# Patient Record
Sex: FEMALE | Race: WHITE | NOT HISPANIC OR LATINO
[De-identification: names, ages, dates, MRNs, and addresses within clinical notes are randomized per-mention and may not be internally consistent; named-entity substitution may affect disease eponyms.]

---

## 2017-01-05 ENCOUNTER — APPOINTMENT (OUTPATIENT)
Dept: CARDIOLOGY | Facility: CLINIC | Age: 52
End: 2017-01-05

## 2017-01-13 ENCOUNTER — APPOINTMENT (OUTPATIENT)
Dept: CARDIOLOGY | Facility: CLINIC | Age: 52
End: 2017-01-13

## 2017-01-16 ENCOUNTER — APPOINTMENT (OUTPATIENT)
Dept: CARDIOLOGY | Facility: CLINIC | Age: 52
End: 2017-01-16

## 2017-03-07 ENCOUNTER — NON-APPOINTMENT (OUTPATIENT)
Age: 52
End: 2017-03-07

## 2017-03-07 ENCOUNTER — APPOINTMENT (OUTPATIENT)
Dept: CARDIOLOGY | Facility: CLINIC | Age: 52
End: 2017-03-07

## 2017-03-07 VITALS
SYSTOLIC BLOOD PRESSURE: 112 MMHG | BODY MASS INDEX: 33.43 KG/M2 | OXYGEN SATURATION: 100 % | WEIGHT: 213 LBS | HEART RATE: 84 BPM | HEIGHT: 67 IN | DIASTOLIC BLOOD PRESSURE: 69 MMHG

## 2018-03-06 ENCOUNTER — EMERGENCY (EMERGENCY)
Facility: HOSPITAL | Age: 53
LOS: 1 days | Discharge: HOME | End: 2018-03-06
Attending: EMERGENCY MEDICINE | Admitting: EMERGENCY MEDICINE
Payer: COMMERCIAL

## 2018-03-06 VITALS
SYSTOLIC BLOOD PRESSURE: 118 MMHG | TEMPERATURE: 99 F | RESPIRATION RATE: 16 BRPM | OXYGEN SATURATION: 96 % | HEART RATE: 75 BPM | DIASTOLIC BLOOD PRESSURE: 77 MMHG

## 2018-03-06 VITALS
RESPIRATION RATE: 15 BRPM | TEMPERATURE: 98 F | WEIGHT: 203.93 LBS | OXYGEN SATURATION: 100 % | SYSTOLIC BLOOD PRESSURE: 140 MMHG | DIASTOLIC BLOOD PRESSURE: 82 MMHG | HEIGHT: 67 IN | HEART RATE: 76 BPM

## 2018-03-06 LAB
ALBUMIN SERPL ELPH-MCNC: 4 G/DL — SIGNIFICANT CHANGE UP (ref 3.3–5)
ALP SERPL-CCNC: 97 U/L — SIGNIFICANT CHANGE UP (ref 40–120)
ALT FLD-CCNC: 30 U/L — SIGNIFICANT CHANGE UP (ref 12–78)
ANION GAP SERPL CALC-SCNC: 9 MMOL/L — SIGNIFICANT CHANGE UP (ref 5–17)
APTT BLD: 32.3 SEC — SIGNIFICANT CHANGE UP (ref 27.5–37.4)
AST SERPL-CCNC: 23 U/L — SIGNIFICANT CHANGE UP (ref 15–37)
BASOPHILS # BLD AUTO: 0.1 K/UL — SIGNIFICANT CHANGE UP (ref 0–0.2)
BASOPHILS NFR BLD AUTO: 1 % — SIGNIFICANT CHANGE UP (ref 0–2)
BILIRUB SERPL-MCNC: 0.4 MG/DL — SIGNIFICANT CHANGE UP (ref 0.2–1.2)
BLD GP AB SCN SERPL QL: SIGNIFICANT CHANGE UP
BUN SERPL-MCNC: 11 MG/DL — SIGNIFICANT CHANGE UP (ref 7–23)
CALCIUM SERPL-MCNC: 8.8 MG/DL — SIGNIFICANT CHANGE UP (ref 8.5–10.1)
CHLORIDE SERPL-SCNC: 105 MMOL/L — SIGNIFICANT CHANGE UP (ref 96–108)
CK MB BLD-MCNC: 1.1 % — SIGNIFICANT CHANGE UP (ref 0–3.5)
CK MB CFR SERPL CALC: 1.8 NG/ML — SIGNIFICANT CHANGE UP (ref 0–3.6)
CK SERPL-CCNC: 169 U/L — SIGNIFICANT CHANGE UP (ref 26–192)
CO2 SERPL-SCNC: 26 MMOL/L — SIGNIFICANT CHANGE UP (ref 22–31)
CREAT SERPL-MCNC: 0.79 MG/DL — SIGNIFICANT CHANGE UP (ref 0.5–1.3)
EOSINOPHIL # BLD AUTO: 0.2 K/UL — SIGNIFICANT CHANGE UP (ref 0–0.5)
EOSINOPHIL NFR BLD AUTO: 2.2 % — SIGNIFICANT CHANGE UP (ref 0–6)
GLUCOSE SERPL-MCNC: 93 MG/DL — SIGNIFICANT CHANGE UP (ref 70–99)
HCG SERPL-ACNC: 2 MIU/ML — SIGNIFICANT CHANGE UP
HCT VFR BLD CALC: 38.7 % — SIGNIFICANT CHANGE UP (ref 34.5–45)
HGB BLD-MCNC: 12.9 G/DL — SIGNIFICANT CHANGE UP (ref 11.5–15.5)
INR BLD: 0.96 RATIO — SIGNIFICANT CHANGE UP (ref 0.88–1.16)
LYMPHOCYTES # BLD AUTO: 1.6 K/UL — SIGNIFICANT CHANGE UP (ref 1–3.3)
LYMPHOCYTES # BLD AUTO: 16.1 % — SIGNIFICANT CHANGE UP (ref 13–44)
MCHC RBC-ENTMCNC: 28.4 PG — SIGNIFICANT CHANGE UP (ref 27–34)
MCHC RBC-ENTMCNC: 33.2 GM/DL — SIGNIFICANT CHANGE UP (ref 32–36)
MCV RBC AUTO: 85.5 FL — SIGNIFICANT CHANGE UP (ref 80–100)
MONOCYTES # BLD AUTO: 0.7 K/UL — SIGNIFICANT CHANGE UP (ref 0–0.9)
MONOCYTES NFR BLD AUTO: 7 % — SIGNIFICANT CHANGE UP (ref 1–9)
NEUTROPHILS # BLD AUTO: 7.1 K/UL — SIGNIFICANT CHANGE UP (ref 1.8–7.4)
NEUTROPHILS NFR BLD AUTO: 73.6 % — SIGNIFICANT CHANGE UP (ref 43–77)
PLATELET # BLD AUTO: 331 K/UL — SIGNIFICANT CHANGE UP (ref 150–400)
POTASSIUM SERPL-MCNC: 3.5 MMOL/L — SIGNIFICANT CHANGE UP (ref 3.5–5.3)
POTASSIUM SERPL-SCNC: 3.5 MMOL/L — SIGNIFICANT CHANGE UP (ref 3.5–5.3)
PROT SERPL-MCNC: 8.2 G/DL — SIGNIFICANT CHANGE UP (ref 6–8.3)
PROTHROM AB SERPL-ACNC: 10.5 SEC — SIGNIFICANT CHANGE UP (ref 9.8–12.7)
RBC # BLD: 4.53 M/UL — SIGNIFICANT CHANGE UP (ref 3.8–5.2)
RBC # FLD: 11.7 % — SIGNIFICANT CHANGE UP (ref 10.3–14.5)
SODIUM SERPL-SCNC: 140 MMOL/L — SIGNIFICANT CHANGE UP (ref 135–145)
TROPONIN I SERPL-MCNC: <.015 NG/ML — SIGNIFICANT CHANGE UP (ref 0.01–0.04)
WBC # BLD: 9.7 K/UL — SIGNIFICANT CHANGE UP (ref 3.8–10.5)
WBC # FLD AUTO: 9.7 K/UL — SIGNIFICANT CHANGE UP (ref 3.8–10.5)

## 2018-03-06 PROCEDURE — 72125 CT NECK SPINE W/O DYE: CPT

## 2018-03-06 PROCEDURE — 85730 THROMBOPLASTIN TIME PARTIAL: CPT

## 2018-03-06 PROCEDURE — 82553 CREATINE MB FRACTION: CPT

## 2018-03-06 PROCEDURE — 99285 EMERGENCY DEPT VISIT HI MDM: CPT

## 2018-03-06 PROCEDURE — 82550 ASSAY OF CK (CPK): CPT

## 2018-03-06 PROCEDURE — 86850 RBC ANTIBODY SCREEN: CPT

## 2018-03-06 PROCEDURE — 71275 CT ANGIOGRAPHY CHEST: CPT | Mod: 26

## 2018-03-06 PROCEDURE — 99284 EMERGENCY DEPT VISIT MOD MDM: CPT | Mod: 25

## 2018-03-06 PROCEDURE — 86901 BLOOD TYPING SEROLOGIC RH(D): CPT

## 2018-03-06 PROCEDURE — 86900 BLOOD TYPING SEROLOGIC ABO: CPT

## 2018-03-06 PROCEDURE — 85610 PROTHROMBIN TIME: CPT

## 2018-03-06 PROCEDURE — 70450 CT HEAD/BRAIN W/O DYE: CPT | Mod: 26

## 2018-03-06 PROCEDURE — 71275 CT ANGIOGRAPHY CHEST: CPT

## 2018-03-06 PROCEDURE — 70450 CT HEAD/BRAIN W/O DYE: CPT

## 2018-03-06 PROCEDURE — 71045 X-RAY EXAM CHEST 1 VIEW: CPT | Mod: 26

## 2018-03-06 PROCEDURE — 93005 ELECTROCARDIOGRAM TRACING: CPT

## 2018-03-06 PROCEDURE — 72125 CT NECK SPINE W/O DYE: CPT | Mod: 26

## 2018-03-06 PROCEDURE — 71045 X-RAY EXAM CHEST 1 VIEW: CPT

## 2018-03-06 PROCEDURE — 84702 CHORIONIC GONADOTROPIN TEST: CPT

## 2018-03-06 PROCEDURE — 80053 COMPREHEN METABOLIC PANEL: CPT

## 2018-03-06 PROCEDURE — 93010 ELECTROCARDIOGRAM REPORT: CPT

## 2018-03-06 PROCEDURE — 85027 COMPLETE CBC AUTOMATED: CPT

## 2018-03-06 PROCEDURE — 36415 COLL VENOUS BLD VENIPUNCTURE: CPT

## 2018-03-06 PROCEDURE — 84484 ASSAY OF TROPONIN QUANT: CPT

## 2018-03-06 PROCEDURE — 73590 X-RAY EXAM OF LOWER LEG: CPT

## 2018-03-06 PROCEDURE — 73590 X-RAY EXAM OF LOWER LEG: CPT | Mod: 26,RT

## 2018-03-06 RX ORDER — OXYCODONE AND ACETAMINOPHEN 5; 325 MG/1; MG/1
1 TABLET ORAL
Qty: 12 | Refills: 0
Start: 2018-03-06 | End: 2018-03-07

## 2018-03-06 RX ORDER — ONDANSETRON 8 MG/1
4 TABLET, FILM COATED ORAL ONCE
Qty: 0 | Refills: 0 | Status: COMPLETED | OUTPATIENT
Start: 2018-03-06 | End: 2018-03-06

## 2018-03-06 RX ORDER — SODIUM CHLORIDE 9 MG/ML
1000 INJECTION INTRAMUSCULAR; INTRAVENOUS; SUBCUTANEOUS
Qty: 0 | Refills: 0 | Status: COMPLETED | OUTPATIENT
Start: 2018-03-06 | End: 2018-03-06

## 2018-03-06 RX ORDER — OXYCODONE AND ACETAMINOPHEN 5; 325 MG/1; MG/1
1 TABLET ORAL ONCE
Qty: 0 | Refills: 0 | Status: DISCONTINUED | OUTPATIENT
Start: 2018-03-06 | End: 2018-03-06

## 2018-03-06 RX ORDER — ONDANSETRON 8 MG/1
1 TABLET, FILM COATED ORAL
Qty: 9 | Refills: 0
Start: 2018-03-06 | End: 2018-03-08

## 2018-03-06 RX ADMIN — SODIUM CHLORIDE 2000 MILLILITER(S): 9 INJECTION INTRAMUSCULAR; INTRAVENOUS; SUBCUTANEOUS at 17:00

## 2018-03-06 RX ADMIN — OXYCODONE AND ACETAMINOPHEN 1 TABLET(S): 5; 325 TABLET ORAL at 21:28

## 2018-03-06 RX ADMIN — OXYCODONE AND ACETAMINOPHEN 1 TABLET(S): 5; 325 TABLET ORAL at 20:58

## 2018-03-06 RX ADMIN — SODIUM CHLORIDE 2000 MILLILITER(S): 9 INJECTION INTRAMUSCULAR; INTRAVENOUS; SUBCUTANEOUS at 19:35
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
## 2018-03-06 NOTE — ED PROVIDER NOTE - CONSTITUTIONAL, MLM
normal... Well appearing, well nourished, awake, alert, oriented to person, place, time/situation and in mild distress

## 2018-03-06 NOTE — ED PROVIDER NOTE - MUSCULOSKELETAL, MLM
Spine appears normal, range of motion is not limited, right shin ttp with ecchymosis, no deformity, no signs compartment syndrome, 2+ pulses
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
## 2018-03-06 NOTE — ED ADULT NURSE REASSESSMENT NOTE - NS ED NURSE REASSESS COMMENT FT1
I spoke with Dr. Kearns that patient had a allergy to codeine states had mouth swelling and he ordered to give Percocet 1 tab once. As per Dr. Kearns I can give the medication.

## 2018-03-06 NOTE — ED PROVIDER NOTE - CARE PLAN
Principal Discharge DX:	Leg injury, right, initial encounter  Secondary Diagnosis:	Chest wall injury, initial encounter

## 2018-03-06 NOTE — ED PROVIDER NOTE - CARDIAC, MLM
Normal rate, regular rhythm.  Heart sounds S1, S2.  No murmurs, rubs or gallops. + sternal ttp, no crepitus , ribs nt
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
## 2018-03-27 ENCOUNTER — APPOINTMENT (OUTPATIENT)
Dept: CARDIOLOGY | Facility: CLINIC | Age: 53
End: 2018-03-27
Payer: COMMERCIAL

## 2018-03-27 ENCOUNTER — NON-APPOINTMENT (OUTPATIENT)
Age: 53
End: 2018-03-27

## 2018-03-27 VITALS — OXYGEN SATURATION: 100 % | HEART RATE: 66 BPM | SYSTOLIC BLOOD PRESSURE: 115 MMHG | DIASTOLIC BLOOD PRESSURE: 72 MMHG

## 2018-03-27 DIAGNOSIS — R94.39 ABNORMAL RESULT OF OTHER CARDIOVASCULAR FUNCTION STUDY: ICD-10-CM

## 2018-03-27 PROCEDURE — 99214 OFFICE O/P EST MOD 30 MIN: CPT | Mod: 25

## 2018-03-27 PROCEDURE — 93000 ELECTROCARDIOGRAM COMPLETE: CPT

## 2018-04-04 ENCOUNTER — APPOINTMENT (OUTPATIENT)
Dept: CARDIOLOGY | Facility: CLINIC | Age: 53
End: 2018-04-04
Payer: COMMERCIAL

## 2018-04-04 PROCEDURE — 93306 TTE W/DOPPLER COMPLETE: CPT

## 2019-01-17 PROBLEM — E78.5 HYPERLIPIDEMIA, UNSPECIFIED: Chronic | Status: ACTIVE | Noted: 2018-03-06

## 2019-01-17 PROBLEM — F41.9 ANXIETY DISORDER, UNSPECIFIED: Chronic | Status: ACTIVE | Noted: 2018-03-06

## 2019-01-30 ENCOUNTER — APPOINTMENT (OUTPATIENT)
Dept: GASTROENTEROLOGY | Facility: CLINIC | Age: 54
End: 2019-01-30
Payer: COMMERCIAL

## 2019-01-30 VITALS
DIASTOLIC BLOOD PRESSURE: 80 MMHG | RESPIRATION RATE: 14 BRPM | OXYGEN SATURATION: 96 % | WEIGHT: 214 LBS | SYSTOLIC BLOOD PRESSURE: 137 MMHG | BODY MASS INDEX: 33.59 KG/M2 | HEIGHT: 67 IN | HEART RATE: 74 BPM

## 2019-01-30 DIAGNOSIS — R13.10 DYSPHAGIA, UNSPECIFIED: ICD-10-CM

## 2019-01-30 DIAGNOSIS — Z86.39 PERSONAL HISTORY OF OTHER ENDOCRINE, NUTRITIONAL AND METABOLIC DISEASE: ICD-10-CM

## 2019-01-30 PROCEDURE — 99203 OFFICE O/P NEW LOW 30 MIN: CPT

## 2019-01-30 NOTE — PHYSICAL EXAM
[General Appearance - Alert] : alert [General Appearance - In No Acute Distress] : in no acute distress [FreeTextEntry1] : A set pleasant female, no acute distress alert and oriented x3 [Sclera] : the sclera and conjunctiva were normal [Neck Appearance] : the appearance of the neck was normal [Neck Cervical Mass (___cm)] : no neck mass was observed [Jugular Venous Distention Increased] : there was no jugular-venous distention [Respiration, Rhythm And Depth] : normal respiratory rhythm and effort [Exaggerated Use Of Accessory Muscles For Inspiration] : no accessory muscle use [Auscultation Breath Sounds / Voice Sounds] : lungs were clear to auscultation bilaterally [Apical Impulse] : the apical impulse was normal [Heart Rate And Rhythm] : heart rate was normal and rhythm regular [Full Pulse] : the pedal pulses are present [Edema] : there was no peripheral edema [Bowel Sounds] : normal bowel sounds [Abdomen Soft] : soft [Abdomen Tenderness] : non-tender [] : no hepato-splenomegaly [Abdomen Mass (___ Cm)] : no abdominal mass palpated [No CVA Tenderness] : no ~M costovertebral angle tenderness [No Spinal Tenderness] : no spinal tenderness [Abnormal Walk] : normal gait [Nail Clubbing] : no clubbing  or cyanosis of the fingernails [Musculoskeletal - Swelling] : no joint swelling seen [Motor Tone] : muscle strength and tone were normal [No Focal Deficits] : no focal deficits [Oriented To Time, Place, And Person] : oriented to person, place, and time [Impaired Insight] : insight and judgment were intact [Affect] : the affect was normal

## 2019-01-30 NOTE — CONSULT LETTER
[Dear  ___] : Dear  [unfilled], [Consult Letter:] : I had the pleasure of evaluating your patient, [unfilled]. [Please see my note below.] : Please see my note below. [Consult Closing:] : Thank you very much for allowing me to participate in the care of this patient.  If you have any questions, please do not hesitate to contact me. [Sincerely,] : Sincerely, [FreeTextEntry2] : Corine Mitchell MD\par [address]\[place] [address]\par  [FreeTextEntry3] : Cisco Freitas MD\par

## 2019-01-30 NOTE — ASSESSMENT
[FreeTextEntry1] : Impression\par \par Gastroesophageal reflux disease\par \par Brother had esophageal cancer\par \par Intermittent dysphasia or\par \par Only using omeprazole on an infrequent basis at this time\par \par Previous upper endoscopy and colonoscopy 3 years ago without significant pathology\par \par Postprandial epigastric and sometimes atypical chest discomfort with radiation under the right ribs into the back\par \par Previous ultrasound negative for gallstones, last one done over 3 years ago\par \par Suggest\par \par Low fat diet\par \par Omeprazole a regular basis\par \par Blood work here today\par \par Ultrasound of the abdomen to look for gallstones or other pathology\par \par Upper endoscopy\par \par Go to emergency room if needed\par \par The patient may call me or return anytime sooner as needed\par \par Risks/benefits:\par The procedure, the risks and benefits and alternatives have been reviewed in great detail with the patient.  Risks including, but not limited to sedation such as cardiac and pulmonary compromise, the procedure itself such as bleeding requiring hospitalization, transfusion, surgery, temporary or permanent colostomy.  Perforation or puncture of the requiring hospitalization, surgery, temporary colostomy.\par \par The patient expresses understanding of the procedure and consents to undergoing the procedure.\par \par

## 2019-01-30 NOTE — REASON FOR VISIT
[Initial Evaluation] : an initial evaluation [FreeTextEntry1] : Brother had esophageal cancer, Gastroesophageal reflux disease, some questionable dysphagia, atypical chest pain, negative cardiac workup a year or 2 ago, epigastric discomfort predominantly postprandial,

## 2019-01-30 NOTE — HISTORY OF PRESENT ILLNESS
[de-identified] : Dr. Mitchell Takes care of this very pleasant 53-year-old female. She's been having intermittent epigastric and atypical chest discomfort that radiates to the back. Pain under the right ribs and radiating to the back. She has heartburn at times, mild sense of dysphagia to solids at times. She's had ultrasounds in the past that were negative for gallstones. The last one probably about 3 years ago. She had an echocardiogram last year that was normal. She thallium stress test that was done 2 years ago that was normal. She is on omeprazole but only using it intermittently. She has a brother that had esophageal cancer. She's had upper endoscopies and colonoscopies. The last of both were probably about 3 years ago. Is no weight loss. No odynophagia. No fever or chills. No anorexia, nausea or vomiting.

## 2019-01-31 ENCOUNTER — OTHER (OUTPATIENT)
Age: 54
End: 2019-01-31

## 2019-01-31 ENCOUNTER — MOBILE ON CALL (OUTPATIENT)
Age: 54
End: 2019-01-31

## 2019-01-31 LAB
ALBUMIN SERPL ELPH-MCNC: 4.4 G/DL
ALP BLD-CCNC: 88 U/L
ALT SERPL-CCNC: 25 U/L
AMYLASE/CREAT SERPL: 34 U/L
ANION GAP SERPL CALC-SCNC: 12 MMOL/L
AST SERPL-CCNC: 31 U/L
BASOPHILS # BLD AUTO: 0.02 K/UL
BASOPHILS NFR BLD AUTO: 0.3 %
BILIRUB SERPL-MCNC: 0.4 MG/DL
BUN SERPL-MCNC: 12 MG/DL
CALCIUM SERPL-MCNC: 9.3 MG/DL
CANCER AG19-9 SERPL-ACNC: 21.2 U/ML
CEA SERPL-MCNC: 1.5 NG/ML
CHLORIDE SERPL-SCNC: 104 MMOL/L
CO2 SERPL-SCNC: 24 MMOL/L
CREAT SERPL-MCNC: 0.72 MG/DL
EOSINOPHIL # BLD AUTO: 0.15 K/UL
EOSINOPHIL NFR BLD AUTO: 2.2 %
FERRITIN SERPL-MCNC: 58 NG/ML
GLUCOSE SERPL-MCNC: 87 MG/DL
HCT VFR BLD CALC: 38.9 %
HGB BLD-MCNC: 12.4 G/DL
IMM GRANULOCYTES NFR BLD AUTO: 0.1 %
IRON SATN MFR SERPL: 19 %
IRON SERPL-MCNC: 60 UG/DL
LPL SERPL-CCNC: 30 U/L
LYMPHOCYTES # BLD AUTO: 1.62 K/UL
LYMPHOCYTES NFR BLD AUTO: 23.4 %
MAN DIFF?: NORMAL
MCHC RBC-ENTMCNC: 27.4 PG
MCHC RBC-ENTMCNC: 31.9 GM/DL
MCV RBC AUTO: 86.1 FL
MONOCYTES # BLD AUTO: 0.61 K/UL
MONOCYTES NFR BLD AUTO: 8.8 %
NEUTROPHILS # BLD AUTO: 4.52 K/UL
NEUTROPHILS NFR BLD AUTO: 65.2 %
PLATELET # BLD AUTO: 315 K/UL
POTASSIUM SERPL-SCNC: 4.4 MMOL/L
PROT SERPL-MCNC: 7.1 G/DL
RBC # BLD: 4.52 M/UL
RBC # FLD: 13.9 %
SODIUM SERPL-SCNC: 140 MMOL/L
TIBC SERPL-MCNC: 313 UG/DL
UIBC SERPL-MCNC: 253 UG/DL
WBC # FLD AUTO: 6.93 K/UL

## 2019-02-13 ENCOUNTER — OTHER (OUTPATIENT)
Age: 54
End: 2019-02-13

## 2019-02-13 ENCOUNTER — TRANSCRIPTION ENCOUNTER (OUTPATIENT)
Age: 54
End: 2019-02-13

## 2019-03-04 ENCOUNTER — TRANSCRIPTION ENCOUNTER (OUTPATIENT)
Age: 54
End: 2019-03-04

## 2019-03-19 ENCOUNTER — APPOINTMENT (OUTPATIENT)
Dept: GASTROENTEROLOGY | Facility: HOSPITAL | Age: 54
End: 2019-03-19

## 2019-04-22 ENCOUNTER — OTHER (OUTPATIENT)
Age: 54
End: 2019-04-22

## 2019-04-22 ENCOUNTER — TRANSCRIPTION ENCOUNTER (OUTPATIENT)
Age: 54
End: 2019-04-22

## 2019-04-23 ENCOUNTER — RESULT REVIEW (OUTPATIENT)
Age: 54
End: 2019-04-23

## 2019-04-23 ENCOUNTER — APPOINTMENT (OUTPATIENT)
Dept: GASTROENTEROLOGY | Facility: HOSPITAL | Age: 54
End: 2019-04-23
Payer: COMMERCIAL

## 2019-04-23 ENCOUNTER — OUTPATIENT (OUTPATIENT)
Dept: OUTPATIENT SERVICES | Facility: HOSPITAL | Age: 54
LOS: 1 days | End: 2019-04-23
Payer: COMMERCIAL

## 2019-04-23 DIAGNOSIS — Z80.0 FAMILY HISTORY OF MALIGNANT NEOPLASM OF DIGESTIVE ORGANS: ICD-10-CM

## 2019-04-23 DIAGNOSIS — R13.10 DYSPHAGIA, UNSPECIFIED: ICD-10-CM

## 2019-04-23 DIAGNOSIS — K58.9 IRRITABLE BOWEL SYNDROME WITHOUT DIARRHEA: ICD-10-CM

## 2019-04-23 DIAGNOSIS — R10.13 EPIGASTRIC PAIN: ICD-10-CM

## 2019-04-23 LAB — HCG UR QL: NEGATIVE — SIGNIFICANT CHANGE UP

## 2019-04-23 PROCEDURE — 88312 SPECIAL STAINS GROUP 1: CPT

## 2019-04-23 PROCEDURE — 88312 SPECIAL STAINS GROUP 1: CPT | Mod: 26

## 2019-04-23 PROCEDURE — 88305 TISSUE EXAM BY PATHOLOGIST: CPT

## 2019-04-23 PROCEDURE — 43239 EGD BIOPSY SINGLE/MULTIPLE: CPT

## 2019-04-23 PROCEDURE — 88313 SPECIAL STAINS GROUP 2: CPT

## 2019-04-23 PROCEDURE — 88313 SPECIAL STAINS GROUP 2: CPT | Mod: 26

## 2019-04-23 PROCEDURE — 88305 TISSUE EXAM BY PATHOLOGIST: CPT | Mod: 26

## 2019-04-23 PROCEDURE — 81025 URINE PREGNANCY TEST: CPT

## 2019-04-24 ENCOUNTER — OTHER (OUTPATIENT)
Age: 54
End: 2019-04-24

## 2019-04-24 LAB — SURGICAL PATHOLOGY STUDY: SIGNIFICANT CHANGE UP

## 2019-07-03 ENCOUNTER — OTHER (OUTPATIENT)
Age: 54
End: 2019-07-03

## 2019-07-29 ENCOUNTER — APPOINTMENT (OUTPATIENT)
Dept: UROGYNECOLOGY | Facility: CLINIC | Age: 54
End: 2019-07-29
Payer: COMMERCIAL

## 2019-07-29 DIAGNOSIS — N36.41 HYPERMOBILITY OF URETHRA: ICD-10-CM

## 2019-07-29 LAB
BILIRUB UR QL STRIP: NORMAL
CLARITY UR: CLEAR
COLLECTION METHOD: NORMAL
GLUCOSE UR-MCNC: NORMAL
HCG UR QL: 0.2 EU/DL
HGB UR QL STRIP.AUTO: NORMAL
KETONES UR-MCNC: NORMAL
LEUKOCYTE ESTERASE UR QL STRIP: NORMAL
NITRITE UR QL STRIP: NORMAL
PH UR STRIP: 7.5
PROT UR STRIP-MCNC: NORMAL
SP GR UR STRIP: 1.01

## 2019-07-29 PROCEDURE — 81003 URINALYSIS AUTO W/O SCOPE: CPT | Mod: QW

## 2019-07-29 PROCEDURE — 51701 INSERT BLADDER CATHETER: CPT

## 2019-07-29 PROCEDURE — 99215 OFFICE O/P EST HI 40 MIN: CPT | Mod: 25

## 2019-07-29 NOTE — PHYSICAL EXAM
[Well developed] : well developed [No Acute Distress] : in no acute distress [Oriented x3] : oriented to person, place, and time [Well Nourished] : ~L well nourished [Bulbocavernous] : bulbocavernous was present [Anal Reflex] : the anal reflex was present [Normal Appearance] : general appearance was normal [Atrophy] : atrophy [Aa ____] : Aa [unfilled] [C ____] : C [unfilled] [Ba ____] : Ba [unfilled] [PB ____] : PB [unfilled] [Ap ____] : Ap [unfilled] [TVL ____] : TVL  [unfilled] [Bp ____] : Bp [unfilled] [D ____] : D [unfilled] [Normal] : normal [Post Void Residual ____ml] : post void residual via catheterization was [unfilled] ml [Cystocele] : a cystocele [Uterine Prolapse] : uterine prolapse

## 2019-07-29 NOTE — HISTORY OF PRESENT ILLNESS
[Cystocele (Obstetric)] : frequent [Uterine Prolapse] : daily [Vaginal Wall Prolapse] : none [Rectal Prolapse] : none [Stress Incontinence] : none [Unable To Restrain Bowel Movement] : none [Feelings Of Urinary Urgency] : rare [x2] : two times a night [Hematuria] : none [Urinary Frequency] : none [Pain During Urination (Dysuria)] : none [Urinary Tract Infection] : none [Constipation Obstructed Defecation] : frequent [Stool Visible Blood] : none [Pelvic Pain] : none [Incomplete Emptying Of Stool] : none [Vaginal Pain] : none [] : none [Rectal Pain] : none [Sexual Dysfunction, NOS] : none [de-identified] : 3 [de-identified] : 3 [FreeTextEntry4] : LMP was 15-16 months ago [de-identified] : 3 [de-identified] : Reports doing kegel exercises daily for the last several years [de-identified] : 3 [de-identified] : 3 [de-identified] : 3 [de-identified] : Reports intake of about 48oz of flavored water and 8oz tea per day.  [FreeTextEntry1] : \par 55yo P4 presenting for follow up of uterovaginal prolapse and stress incontinence. She reports trying years of Kegel exercises without improvement. She desires definitive management. Reports episode of postmenopausal bleeding in March and had a pelvic ultrasound and endometrial biopsy with GYN (Dr. Adriano Hagan).

## 2019-07-29 NOTE — DISCUSSION/SUMMARY
[FreeTextEntry1] :  I reviewed the above findings with the patient with visual illustrations. Treatment options for the prolapse were discussed and included doing nothing, Kegel exercises and behavioral modification, a pessary, or surgical correction.Surgically we discussed the abdominal vs the vaginal routes. Abdominally we discussed a hysterectomy and a sacral colpopexy   laparoscopically and robotically.  Vaginally we discussed a vaginal hysterectomy, uterosacral suspension, and anterior/posterior repair. Surgically we discussed hysteropexy as well as the use of biologics. She is interested in surgical correction. Patient given IUGA POP and stress incontinence handouts.  We discussed prior to surgical intervention, she will return for urodynamic testing to further evaluate her urinary complaints. She states she had vaginal bleeding in March and had a pelvic ultrasound and endometrial biopsy performed. We will try to obtain these records and patient will bring the to her next visit. All questions addressed and answered.

## 2019-07-29 NOTE — CHIEF COMPLAINT
[Falling Pelvic Organs] : falling pelvic organs [Cannot Empty Bladder] : cannot empty bladder [Pelvic Strengthening] : pelvic strengthening

## 2019-08-26 ENCOUNTER — APPOINTMENT (OUTPATIENT)
Dept: UROGYNECOLOGY | Facility: CLINIC | Age: 54
End: 2019-08-26
Payer: COMMERCIAL

## 2019-08-26 ENCOUNTER — OUTPATIENT (OUTPATIENT)
Dept: OUTPATIENT SERVICES | Facility: HOSPITAL | Age: 54
LOS: 1 days | End: 2019-08-26
Payer: MEDICAID

## 2019-08-26 PROCEDURE — 51797 INTRAABDOMINAL PRESSURE TEST: CPT | Mod: 26

## 2019-08-26 PROCEDURE — 51784 ANAL/URINARY MUSCLE STUDY: CPT

## 2019-08-26 PROCEDURE — 51797 INTRAABDOMINAL PRESSURE TEST: CPT

## 2019-08-26 PROCEDURE — 51729 CYSTOMETROGRAM W/VP&UP: CPT

## 2019-08-26 PROCEDURE — 51729 CYSTOMETROGRAM W/VP&UP: CPT | Mod: 26

## 2019-08-26 PROCEDURE — 51784 ANAL/URINARY MUSCLE STUDY: CPT | Mod: 26

## 2019-08-28 ENCOUNTER — APPOINTMENT (OUTPATIENT)
Dept: UROGYNECOLOGY | Facility: CLINIC | Age: 54
End: 2019-08-28
Payer: COMMERCIAL

## 2019-08-28 DIAGNOSIS — Z01.818 ENCOUNTER FOR OTHER PREPROCEDURAL EXAMINATION: ICD-10-CM

## 2019-08-28 PROCEDURE — 99214 OFFICE O/P EST MOD 30 MIN: CPT

## 2019-08-28 NOTE — HISTORY OF PRESENT ILLNESS
[FreeTextEntry1] : Patient returns today for followup on her uterovaginal prolapse and urinary incontinence. Her chart was reviewed. Review of symptoms was unchanged from visit dated July 29, 2019. On that visit she had a stage III uterovaginal prolapse with cystocele and rectocele. She had an endometrial biopsy performed in March of this year which revealed an atrophic endometrium she had a pelvic ultrasound in May of this year which showed a uterus 3.6 x 5.2 x 5.7.She underwent urodynamic testing earlier this month which revealed stress urinary incontinence. I reviewed these findings with her.  She has been doing Kegels. PSH + BTL. I reviewed the above findings with the patient with visual illustrations. Treatment options for the prolapse were discussed and included doing nothing, Kegel exercises and behavioral modification, a pessary, or surgical correction.Surgically we discussed the abdominal vs the vaginal routes. Abdominally we discussed a hysterectomy and a sacral colpopexy   laparoscopically and robotically.  Vaginally we discussed a vaginal hysterectomy, uterosacral suspension, and anterior/posterior repair. Surgically we discussed hysteropexy as well as the use of biologics. We discussed the stress incontinence as well as surgical and nonsurgical treatment options. Risks and benefits of the various procedures were discussed as well as hospital stay and postoperative restrictions. We discussed the possibility of going home with the catheter as well as failure. She like to think about her options however is leaning towards the laparoscopic/robotic route for the prolapse and sling for stress incontinence. I UGA patient information on sacral colpopexy and mid urethral sling was given to her. She will call me with her final decision and that she would like to schedule surgery. All questions were answered.\par 
No

## 2019-10-30 ENCOUNTER — OUTPATIENT (OUTPATIENT)
Dept: OUTPATIENT SERVICES | Facility: HOSPITAL | Age: 54
LOS: 1 days | End: 2019-10-30
Payer: COMMERCIAL

## 2019-10-30 VITALS
DIASTOLIC BLOOD PRESSURE: 77 MMHG | RESPIRATION RATE: 15 BRPM | TEMPERATURE: 98 F | WEIGHT: 203.05 LBS | HEART RATE: 72 BPM | SYSTOLIC BLOOD PRESSURE: 120 MMHG | OXYGEN SATURATION: 98 % | HEIGHT: 60.5 IN

## 2019-10-30 DIAGNOSIS — N39.3 STRESS INCONTINENCE (FEMALE) (MALE): ICD-10-CM

## 2019-10-30 DIAGNOSIS — N81.4 UTEROVAGINAL PROLAPSE, UNSPECIFIED: ICD-10-CM

## 2019-10-30 DIAGNOSIS — Z98.51 TUBAL LIGATION STATUS: Chronic | ICD-10-CM

## 2019-10-30 DIAGNOSIS — J32.9 CHRONIC SINUSITIS, UNSPECIFIED: ICD-10-CM

## 2019-10-30 DIAGNOSIS — N81.11 CYSTOCELE, MIDLINE: ICD-10-CM

## 2019-10-30 DIAGNOSIS — Z29.9 ENCOUNTER FOR PROPHYLACTIC MEASURES, UNSPECIFIED: ICD-10-CM
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
## 2019-10-30 DIAGNOSIS — Z01.818 ENCOUNTER FOR OTHER PREPROCEDURAL EXAMINATION: ICD-10-CM

## 2019-10-30 DIAGNOSIS — N81.2 INCOMPLETE UTEROVAGINAL PROLAPSE: ICD-10-CM

## 2019-10-30 LAB
ANION GAP SERPL CALC-SCNC: 12 MMOL/L — SIGNIFICANT CHANGE UP (ref 5–17)
BLD GP AB SCN SERPL QL: NEGATIVE — SIGNIFICANT CHANGE UP
BUN SERPL-MCNC: 14 MG/DL — SIGNIFICANT CHANGE UP (ref 7–23)
CALCIUM SERPL-MCNC: 10.2 MG/DL — SIGNIFICANT CHANGE UP (ref 8.4–10.5)
CHLORIDE SERPL-SCNC: 101 MMOL/L — SIGNIFICANT CHANGE UP (ref 96–108)
CO2 SERPL-SCNC: 26 MMOL/L — SIGNIFICANT CHANGE UP (ref 22–31)
CREAT SERPL-MCNC: 0.82 MG/DL — SIGNIFICANT CHANGE UP (ref 0.5–1.3)
GLUCOSE SERPL-MCNC: 133 MG/DL — HIGH (ref 70–99)
HCT VFR BLD CALC: 41.6 % — SIGNIFICANT CHANGE UP (ref 34.5–45)
HGB BLD-MCNC: 13.5 G/DL — SIGNIFICANT CHANGE UP (ref 11.5–15.5)
MCHC RBC-ENTMCNC: 28.5 PG — SIGNIFICANT CHANGE UP (ref 27–34)
MCHC RBC-ENTMCNC: 32.5 GM/DL — SIGNIFICANT CHANGE UP (ref 32–36)
MCV RBC AUTO: 87.9 FL — SIGNIFICANT CHANGE UP (ref 80–100)
PLATELET # BLD AUTO: 392 K/UL — SIGNIFICANT CHANGE UP (ref 150–400)
POTASSIUM SERPL-MCNC: 4.4 MMOL/L — SIGNIFICANT CHANGE UP (ref 3.5–5.3)
POTASSIUM SERPL-SCNC: 4.4 MMOL/L — SIGNIFICANT CHANGE UP (ref 3.5–5.3)
RBC # BLD: 4.73 M/UL — SIGNIFICANT CHANGE UP (ref 3.8–5.2)
RBC # FLD: 12.5 % — SIGNIFICANT CHANGE UP (ref 10.3–14.5)
RH IG SCN BLD-IMP: POSITIVE — SIGNIFICANT CHANGE UP
SODIUM SERPL-SCNC: 139 MMOL/L — SIGNIFICANT CHANGE UP (ref 135–145)
WBC # BLD: 7.97 K/UL — SIGNIFICANT CHANGE UP (ref 3.8–10.5)
WBC # FLD AUTO: 7.97 K/UL — SIGNIFICANT CHANGE UP (ref 3.8–10.5)

## 2019-10-30 PROCEDURE — 86901 BLOOD TYPING SEROLOGIC RH(D): CPT

## 2019-10-30 PROCEDURE — 83036 HEMOGLOBIN GLYCOSYLATED A1C: CPT

## 2019-10-30 PROCEDURE — 86900 BLOOD TYPING SEROLOGIC ABO: CPT

## 2019-10-30 PROCEDURE — G0463: CPT

## 2019-10-30 PROCEDURE — 80048 BASIC METABOLIC PNL TOTAL CA: CPT

## 2019-10-30 PROCEDURE — 85027 COMPLETE CBC AUTOMATED: CPT

## 2019-10-30 PROCEDURE — 86850 RBC ANTIBODY SCREEN: CPT

## 2019-10-30 RX ORDER — CHLORHEXIDINE GLUCONATE 213 G/1000ML
1 SOLUTION TOPICAL ONCE
Refills: 0 | Status: DISCONTINUED | OUTPATIENT
Start: 2019-11-12 | End: 2019-11-12

## 2019-10-30 RX ORDER — LIDOCAINE HCL 20 MG/ML
0.2 VIAL (ML) INJECTION ONCE
Refills: 0 | Status: DISCONTINUED | OUTPATIENT
Start: 2019-11-12 | End: 2019-11-12

## 2019-10-30 RX ORDER — CEFOTETAN DISODIUM 1 G
2 VIAL (EA) INJECTION ONCE
Refills: 0 | Status: COMPLETED | OUTPATIENT
Start: 2019-11-12 | End: 2019-11-12

## 2019-10-30 NOTE — H&P PST ADULT - ASSESSMENT
prolapse of uterus   CAPRINI VTE 2.0 SCORE [CLOT updated 2019]    AGE RELATED RISK FACTORS                                                       MOBILITY RELATED FACTORS  [x ] Age 41-60 years                                            (1 Point)                    [ ] Bed rest                                                        (1 Point)  [ ] Age: 61-74 years                                           (2 Points)                  [ ] Plaster cast                                                   (2 Points)  [ ] Age= 75 years                                              (3 Points)                    [ ] Bed bound for more than 72 hours                 (2 Points)    DISEASE RELATED RISK FACTORS                                               GENDER SPECIFIC FACTORS  [ ] Edema in the lower extremities                       (1 Point)              [ ] Pregnancy                                                     (1 Point)  [ ] Varicose veins                                               (1 Point)                     [ ] Post-partum < 6 weeks                                   (1 Point)             [x ] BMI > 25 Kg/m2                                            (1 Point)                     [ ] Hormonal therapy  or oral contraception          (1 Point)                 [ ] Sepsis (in the previous month)                        (1 Point)               [ ] History of pregnancy complications                 (1 point)  [ ] Pneumonia or serious lung disease                                               [ ] Unexplained or recurrent                     (1 Point)           (in the previous month)                               (1 Point)  [ ] Abnormal pulmonary function test                     (1 Point)                 SURGERY RELATED RISK FACTORS  [ ] Acute myocardial infarction                              (1 Point)               [ ]  Section                                             (1 Point)  [ ] Congestive heart failure (in the previous month)  (1 Point)      [ ] Minor surgery                                                  (1 Point)   [ ] Inflammatory bowel disease                             (1 Point)               [ ] Arthroscopic surgery                                        (2 Points)  [ ] Central venous access                                      (2 Points)                [x ] General surgery lasting more than 45 minutes (2 points)  [ ] Malignancy- Present or previous                   (2 Points)                [ ] Elective arthroplasty                                         (5 points)    [ ] Stroke (in the previous month)                          (5 Points)                                                                                                                                                           HEMATOLOGY RELATED FACTORS                                                 TRAUMA RELATED RISK FACTORS  [ ] Prior episodes of VTE                                     (3 Points)                [ ] Fracture of the hip, pelvis, or leg                       (5 Points)  [ ] Positive family history for VTE                         (3 Points)             [ ] Acute spinal cord injury (in the previous month)  (5 Points)  [ ] Prothrombin 32353 A                                     (3 Points)               [ ] Paralysis  (less than 1 month)                             (5 Points)  [ ] Factor V Leiden                                             (3 Points)                  [ ] Multiple Trauma within 1 month                        (5 Points)  [ ] Lupus anticoagulants                                     (3 Points)                                                           [ ] Anticardiolipin antibodies                               (3 Points)                                                       [ ] High homocysteine in the blood                      (3 Points)                                             [ ] Other congenital or acquired thrombophilia      (3 Points)                                                [ ] Heparin induced thrombocytopenia                  (3 Points)                                     Total Score [ 4  ]

## 2019-10-30 NOTE — H&P PST ADULT - NSICDXPASTMEDICALHX_GEN_ALL_CORE_FT
PAST MEDICAL HISTORY:  Anxiety     Chronic sinusitis for 20 years  DNS  S/p urgent care visit today before pre op/ upcoming GYn surgery    HLD (hyperlipidemia)     Uterus prolapse
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
## 2019-10-30 NOTE — H&P PST ADULT - HISTORY OF PRESENT ILLNESS
54 year old multiparous  female PMH of HLD, chronic sinusitis, prolapse of uterus with stress incontinence, scheduled for midurethral sling, cystoscopy & laparoscopic robotic assisted suprapubic hysterectomy on 11/12/19.

## 2019-10-30 NOTE — H&P PST ADULT - NSANTHOSAYNRD_GEN_A_CORE
No. PARVIN screening performed.  STOP BANG Legend: 0-2 = LOW Risk; 3-4 = INTERMEDIATE Risk; 5-8 = HIGH Risk

## 2019-10-30 NOTE — H&P PST ADULT - NSICDXPROBLEM_GEN_ALL_CORE_FT
PROBLEM DIAGNOSES  Problem: Uterine prolapse  Assessment and Plan: midurethral sling  cystoscopy  laparoscopic robotic assisted suprapubic hystrectomy  sacral colpopexy     Problem: Chronic sinusitis  Assessment and Plan: patient on steroids/ augmentn from today   Instructed to continue meds , antibiotics till 11/5/19  pre op medical eval     Problem: Prophylactic measure  Assessment and Plan: The Caprini score indicates this patient is at risk for a VTE event (score 3-5).  Most surgical patients in this group would benefit from pharmacologic prophylaxis.  The surgical team will determine the balance between VTE risk and bleeding risk

## 2019-10-31 LAB — HBA1C BLD-MCNC: 5.6 % — SIGNIFICANT CHANGE UP (ref 4–5.6)

## 2019-11-06 ENCOUNTER — APPOINTMENT (OUTPATIENT)
Dept: UROGYNECOLOGY | Facility: CLINIC | Age: 54
End: 2019-11-06
Payer: COMMERCIAL

## 2019-11-06 PROCEDURE — 99214 OFFICE O/P EST MOD 30 MIN: CPT

## 2019-11-06 NOTE — PHYSICAL EXAM
[No Acute Distress] : in no acute distress [Well developed] : well developed [Well Nourished] : ~L well nourished [Oriented x3] : oriented to person, place, and time [Bulbocavernous] : bulbocavernous was present [Anal Reflex] : the anal reflex was present [Normal Appearance] : general appearance was normal [Atrophy] : atrophy [Cystocele] : a cystocele [Uterine Prolapse] : uterine prolapse [Aa ____] : Aa [unfilled] [Ba ____] : Ba [unfilled] [C ____] : C [unfilled] [PB ____] : PB [unfilled] [TVL ____] : TVL  [unfilled] [Ap ____] : Ap [unfilled] [Bp ____] : Bp [unfilled] [D ____] : D [unfilled] [Normal] : normal [Post Void Residual ____ml] : post void residual via catheterization was [unfilled] ml

## 2019-11-07 NOTE — DISCUSSION/SUMMARY
[FreeTextEntry1] : Maura  presents for followup, she has uterovaginal prolapse and stress urinary incontinence and desires surgical management. Visual illustrations were used and mesh was shown to the patient. I reviewed the above findings with the patient as well surgical approaches including vaginal and abdominal, mesh and native tissue repairs, and nonsurgical treatment options for the prolapse and stress incontinence and she wishes to proceed with surgical correction via robotic assisted laparoscopic hysterectomy and sacral colpopexy for the prolapse and mid urethral sling for stress incontinence. Risks and benefits of a supracervical hysterectomy versus total were discussed and she wishes to proceed with a supracervical hysterectomy. Risks and benefits of the BSO were discussed and she wishes to proceed with a bilateral salpingectomy and possible oophorectomy if ovaries appear abnormal at time of surgery. We discussed the possibility of a laparotomy at the time of surgical correction. We discussed the placement of a suburethral sling at time of surgical correction for prolapse. Risks and benefits of a TOT sling versus a retropubic sling were discussed and included but not limited to bladder and bowel perforation, voiding dysfunction, and groin pain. She wishes to proceed with a retropubic sling. She is aware surgery is not meant to correct OAB symptoms. The FDA notification on mesh was discussed.  Risks and benefits of the procedure were discussed and included but not limited to infection, bleeding, including transfusion, surrounding organ or tissue injury (bladder, rectum, bowel, urethra, ureters, nerves vessels or muscles), failure meaning recurrent prolapse, leaking, voiding dysfunction, needing to go home with a catheter, pain with sex, blood clots, and anesthesia. In addition we discussed risks related to mesh including but not limited to infection, erosion and chronic inflammation, acute and chronic pain, pain with intercourse (both of which may be refractory to treatment) fistula, cervical elongation, disturbance in bowel or bladder function, allergic reaction any of which may require additional surgery for mesh revision.  She is aware that the mesh used in her surgery is a permanent mesh. We discussed the possibility of going home with a catheter. Hospital stay, postoperative restrictions, and anesthesia were discussed. All risks were explained in layman’s terms All questions were answered and she wishes to proceed with surgical correction.  Consent was signed in the office for robotic assisted supracervical hysterectomy, bilateral salpingectomy, sacral colpopexy, midurethral sling, cystoscopy possible laparotomy, possible oophorectomy.  IUGA handout on sacral colpopexy and midurethral sling was given to her. I was able to answer all her questions.\par \par [] med clearance\par [] surgical booking\par \par \par

## 2019-11-07 NOTE — HISTORY OF PRESENT ILLNESS
[FreeTextEntry1] : 53yo with Stage 3 prolapse and AMARILYS presents for follow up. She continues to be interested in surgery. She has no other complaints today.\par \par PMH: HLD, IBS\par PSH: none\par \par US: 3.6x5.19x5.67cm, fibroid uterus, ET 4mm\par UDS: +CST at all vol, no ISD, no DI\par Pap: 9/2018, neg

## 2019-11-08 ENCOUNTER — RESULT REVIEW (OUTPATIENT)
Age: 54
End: 2019-11-08

## 2019-11-11 ENCOUNTER — TRANSCRIPTION ENCOUNTER (OUTPATIENT)
Age: 54
End: 2019-11-11

## 2019-11-12 ENCOUNTER — INPATIENT (INPATIENT)
Facility: HOSPITAL | Age: 54
LOS: 0 days | Discharge: HOME | DRG: 743 | End: 2019-11-13
Attending: UROLOGY | Admitting: UROLOGY
Payer: COMMERCIAL

## 2019-11-12 ENCOUNTER — APPOINTMENT (OUTPATIENT)
Dept: UROGYNECOLOGY | Facility: HOSPITAL | Age: 54
End: 2019-11-12

## 2019-11-12 VITALS — HEIGHT: 60.5 IN | WEIGHT: 203.05 LBS

## 2019-11-12 DIAGNOSIS — N81.2 INCOMPLETE UTEROVAGINAL PROLAPSE: ICD-10-CM

## 2019-11-12 DIAGNOSIS — N81.11 CYSTOCELE, MIDLINE: ICD-10-CM
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
## 2019-11-12 DIAGNOSIS — Z98.51 TUBAL LIGATION STATUS: Chronic | ICD-10-CM

## 2019-11-12 LAB
GLUCOSE BLDC GLUCOMTR-MCNC: 98 MG/DL — SIGNIFICANT CHANGE UP (ref 70–99)
RH IG SCN BLD-IMP: POSITIVE — SIGNIFICANT CHANGE UP

## 2019-11-12 PROCEDURE — 88302 TISSUE EXAM BY PATHOLOGIST: CPT | Mod: 26

## 2019-11-12 PROCEDURE — 88305 TISSUE EXAM BY PATHOLOGIST: CPT | Mod: 26

## 2019-11-12 PROCEDURE — 57288 REPAIR BLADDER DEFECT: CPT

## 2019-11-12 PROCEDURE — 58542 LSH W/T/O UT 250 G OR LESS: CPT

## 2019-11-12 PROCEDURE — 57425 LAPAROSCOPY SURG COLPOPEXY: CPT

## 2019-11-12 RX ORDER — HYDROMORPHONE HYDROCHLORIDE 2 MG/ML
0.5 INJECTION INTRAMUSCULAR; INTRAVENOUS; SUBCUTANEOUS
Refills: 0 | Status: DISCONTINUED | OUTPATIENT
Start: 2019-11-12 | End: 2019-11-13

## 2019-11-12 RX ORDER — ONDANSETRON 8 MG/1
4 TABLET, FILM COATED ORAL EVERY 8 HOURS
Refills: 0 | Status: DISCONTINUED | OUTPATIENT
Start: 2019-11-12 | End: 2019-11-13

## 2019-11-12 RX ORDER — TRAMADOL HYDROCHLORIDE 50 MG/1
25 TABLET ORAL EVERY 6 HOURS
Refills: 0 | Status: DISCONTINUED | OUTPATIENT
Start: 2019-11-12 | End: 2019-11-13

## 2019-11-12 RX ORDER — METOCLOPRAMIDE HCL 10 MG
10 TABLET ORAL ONCE
Refills: 0 | Status: DISCONTINUED | OUTPATIENT
Start: 2019-11-12 | End: 2019-11-13

## 2019-11-12 RX ORDER — ACETAMINOPHEN 500 MG
1000 TABLET ORAL ONCE
Refills: 0 | Status: COMPLETED | OUTPATIENT
Start: 2019-11-12 | End: 2019-11-12

## 2019-11-12 RX ORDER — ONDANSETRON 8 MG/1
4 TABLET, FILM COATED ORAL ONCE
Refills: 0 | Status: DISCONTINUED | OUTPATIENT
Start: 2019-11-12 | End: 2019-11-13

## 2019-11-12 RX ORDER — KETOROLAC TROMETHAMINE 30 MG/ML
30 SYRINGE (ML) INJECTION EVERY 6 HOURS
Refills: 0 | Status: DISCONTINUED | OUTPATIENT
Start: 2019-11-12 | End: 2019-11-13

## 2019-11-12 RX ORDER — FAMOTIDINE 10 MG/ML
20 INJECTION INTRAVENOUS ONCE
Refills: 0 | Status: COMPLETED | OUTPATIENT
Start: 2019-11-12 | End: 2019-11-12

## 2019-11-12 RX ORDER — ACETAMINOPHEN 500 MG
975 TABLET ORAL EVERY 6 HOURS
Refills: 0 | Status: DISCONTINUED | OUTPATIENT
Start: 2019-11-12 | End: 2019-11-13

## 2019-11-12 RX ORDER — CELECOXIB 200 MG/1
200 CAPSULE ORAL ONCE
Refills: 0 | Status: COMPLETED | OUTPATIENT
Start: 2019-11-12 | End: 2019-11-12

## 2019-11-12 RX ORDER — SODIUM CHLORIDE 9 MG/ML
1000 INJECTION, SOLUTION INTRAVENOUS
Refills: 0 | Status: DISCONTINUED | OUTPATIENT
Start: 2019-11-12 | End: 2019-11-13

## 2019-11-12 RX ORDER — ACETAMINOPHEN 500 MG
975 TABLET ORAL ONCE
Refills: 0 | Status: DISCONTINUED | OUTPATIENT
Start: 2019-11-12 | End: 2019-11-12

## 2019-11-12 RX ADMIN — Medication 30 MILLIGRAM(S): at 19:42

## 2019-11-12 RX ADMIN — CELECOXIB 200 MILLIGRAM(S): 200 CAPSULE ORAL at 12:30

## 2019-11-12 RX ADMIN — SODIUM CHLORIDE 75 MILLILITER(S): 9 INJECTION, SOLUTION INTRAVENOUS at 21:50

## 2019-11-12 RX ADMIN — Medication 30 MILLIGRAM(S): at 20:30

## 2019-11-12 RX ADMIN — Medication 400 MILLIGRAM(S): at 23:17

## 2019-11-12 RX ADMIN — FAMOTIDINE 20 MILLIGRAM(S): 10 INJECTION INTRAVENOUS at 12:28

## 2019-11-12 RX ADMIN — HYDROMORPHONE HYDROCHLORIDE 0.5 MILLIGRAM(S): 2 INJECTION INTRAMUSCULAR; INTRAVENOUS; SUBCUTANEOUS at 20:30

## 2019-11-12 RX ADMIN — HYDROMORPHONE HYDROCHLORIDE 0.5 MILLIGRAM(S): 2 INJECTION INTRAMUSCULAR; INTRAVENOUS; SUBCUTANEOUS at 19:42

## 2019-11-12 NOTE — PACU DISCHARGE NOTE - MENTAL STATUS: PATIENT PARTICIPATION

## 2019-11-12 NOTE — BRIEF OPERATIVE NOTE - NSICDXBRIEFPROCEDURE_GEN_ALL_CORE_FT
PROCEDURES:  Hysterectomy, supracervical, robot-assisted, laparoscopic, for uterus 250 grams or less 12-Nov-2019 18:17:27  Viktoriya Casillas  Robot-assisted sacrocolpopexy 12-Nov-2019 18:16:04  Viktoriya Casillas  Bilateral salpingectomy 12-Nov-2019 18:17:35  Viktoriya Casillas  Creation, midurethral sling, retropubic approach 12-Nov-2019 18:15:51  Viktoriya Casillas  Cystoscopy 12-Nov-2019 18:15:43  Viktoriya Casillas

## 2019-11-12 NOTE — CHART NOTE - NSCHARTNOTEFT_GEN_A_CORE
R2 OBGYN POST-OP CHECK    S: Patient seen and evaluated at bedside.  Pt awake and alert resting comfortaby in bed   Patient reports pain controlled with analgesia. Pt denies N/V, SOB, CP, palpitations, fever/chills. Tolerating solids.  Not OOB yet.    O:   T(C): 36.8 (11-13-19 @ 00:19), Max: 36.8 (11-13-19 @ 00:19)  HR: 79 (11-12-19 @ 22:30) (79 - 81)  BP: 111/71 (11-12-19 @ 22:30) (111/71 - 122/68)  RR: 19 (11-13-19 @ 00:19) (16 - 19)  SpO2: 96% (11-13-19 @ 00:19) (94% - 97%)  Wt(kg): --  I&O's Summary    12 Nov 2019 07:01  -  13 Nov 2019 01:56  --------------------------------------------------------  IN: 600 mL / OUT: 205 mL / NET: 395 mL        Gen: Resting comfortably in bed, NAD  CV: S1S2, RRR  Lungs: CTA B/L  Abd: soft, appropriately tender, occasional BS x 4 quadrants.    Inc: Port incision/sling incision Clean/dry/intact w/ bandage in place  Ext: SCD's in place and functional, non-tender b/l, no edema    A/P: 54y Female s/p RA KYMBERLY, BS, MUS, Cysto, CSS doing well post-operatively    Neuro: PO Analgesia PRN  vs. PCA for pain control  CV: Hemodynamically stable.  Monitor VS. CBC in AM.   Pulm: Saturating well on room air.  Encourage OOB and incentive spirometer use.   GI: Advance to regular diet. Anti-emetics PRN.  : Bai to gravity. TOV in AM  FEN: Electrolytes: LR@75cc/hr. BMP in AM.   Heme: DVT ppx w/ SCD's while in bed. Early ambulation, initially with assistance then as tolerated.  Continue HSQ   ID: Afebrile  Endo: No active issues   Dispo: Continue inpatient care    Noam Rollins PGY-2

## 2019-11-12 NOTE — BRIEF OPERATIVE NOTE - NSICDXBRIEFPREOP_GEN_ALL_CORE_FT
PRE-OP DIAGNOSIS:  Urinary, incontinence, stress female 12-Nov-2019 18:18:02  Viktoriya Casillas  Incomplete uterovaginal prolapse 12-Nov-2019 18:17:56  Viktoriya Casillas

## 2019-11-12 NOTE — BRIEF OPERATIVE NOTE - NSICDXBRIEFPOSTOP_GEN_ALL_CORE_FT
POST-OP DIAGNOSIS:  Urinary, incontinence, stress female 12-Nov-2019 18:18:18  Viktoriya Casillas  Incomplete uterovaginal prolapse 12-Nov-2019 18:18:12  Viktoriya Casillas

## 2019-11-12 NOTE — BRIEF OPERATIVE NOTE - OPERATION/FINDINGS
8wk sized mobile, anteverted uterus. Portions of fallopian tubes surgically absent bilaterally. Normal appearing ovaries.  On cystoscopy, normal bladder mucosa. No suture, injury, mesh, or foreign object. Bilateral ureteral orifices visualized and effluxing clear yellow urine. On rectal exam, no injury or suture.

## 2019-11-12 NOTE — BRIEF OPERATIVE NOTE - COMMENTS
Palliative Medicine Follow-up Note    Date of Service: 11/02/19    CURRENT ISSUES: Significant clinical deterioration in past 24 hours; chest wall and abdominal pain; code status clarification    Subjective:   Much better pain control today. Patient able to rest. Dr Russell saw this morning, did talk with daughter a bit about hospice option. Spoke with her myself as well. Patient says she is much more comfortable, more awake. Does have some pain still on her side at times, isn't gone completely. We spoke about using the PRN dose if needed or if she has a flare. Spoke with daughter about hospice, and she notes she has been talking some with patient. Said that she had wanted to go home, but they don't feel it is possible, and I agree if they don't feel it is a good option we should look elsewhere. Talked about [facility], which daughter was interested in looking into. She said that she may go and have a look at it. Was willing to talk with SW about a referral as well.     PHYSICAL EXAM:    Vital Last Value 24 Hour Range   Temperature 97.7 °F (36.5 °C) (11/02/19 0522) Temp  Min: 97.7 °F (36.5 °C)  Max: 98.3 °F (36.8 °C)   Pulse 108 (11/02/19 0522) Pulse  Min: 100  Max: 110   Respiratory 18 (11/02/19 0949) Resp  Min: 18  Max: 22   Non-Invasive  Blood Pressure 128/69 (11/02/19 0522) BP  Min: 115/65  Max: 128/69   Pulse Oximetry 96 % (11/02/19 0949) SpO2  Min: 92 %  Max: 96 %   Arterial   Blood Pressure   No data recorded   LBM: 1 (11/02/19 0845)     Intake/Output Summary (Last 24 hours) at 11/2/2019 1205  Last data filed at 11/2/2019 1013  Gross per 24 hour   Intake 560.27 ml   Output --   Net 560.27 ml        On exam, Latha is more alert, eyes open, answering questions about her pain appropriately, face relaxed, breathing is regular and unlabored, no myoclonus.     Labs  Albumin (g/dL)   Date Value   10/31/2019 2.6 (L)   10/30/2019 2.7 (L)   10/29/2019 2.6 (L)   10/28/2019 3.1 (L)     Creatinine (mg/dL)   Date Value    11/01/2019 0.62   10/31/2019 0.95   10/30/2019 0.72   10/29/2019 0.78     AST/SGOT (Units/L)   Date Value   10/31/2019 186 (H)   10/30/2019 52 (H)   10/29/2019 50 (H)   10/28/2019 60 (H)     ALT/SGPT (Units/L)   Date Value   10/31/2019 55   10/30/2019 17   10/29/2019 18   10/28/2019 22     ALK PHOSPHATASE (Units/L)   Date Value   10/31/2019 415 (H)   10/30/2019 273 (H)   10/29/2019 259 (H)   10/28/2019 333 (H)     TOTAL BILIRUBIN (mg/dL)   Date Value   10/31/2019 3.9 (H)   10/30/2019 0.9   10/29/2019 0.8   10/28/2019 0.9       Recent Imaging/Procedures   KUB Supine overnight 10/31  IMPRESSION:     1.  No dilated loops of small or large bowel are visualized.  2.  Small bilateral pleural effusion and associated airspace opacification,  better visualized on CT chest 10/20/2019.    Palliative Assessment and Plan:    Goals of Care/Counseling/Coordination of Care:   Hospice planning - daughter looking at options, may go take a look at [facility], and she is also communicating with patient. They confirmed we are on the same page in that hospice is a good option to look into from here. I did speak with SW about making a referral and further support for answering any additional hospice questions.     Symptom Management:  Pain: Much better today. On dilaudid gtt at 0.3 mg/hr.. No PRN doses needed. Could trial backing off a smidge if pt up for it at any point, but will not change today given that she still has some complaints and has just finally been feeling comfortable.     Dyspnea: on O2, no evident dyspnea      Billing Statement:  Total time for today's encounter was >15 minutes, with more than 50% of that time spent counseling and coordinating care regarding the above.     Patient's care was discussed with bedside nurse and Dr. Russell, as well as FANI Durán MD  [place] [facility]  [phone]             Operative report [id number]

## 2019-11-13 ENCOUNTER — TRANSCRIPTION ENCOUNTER (OUTPATIENT)
Age: 54
End: 2019-11-13

## 2019-11-13 VITALS
RESPIRATION RATE: 16 BRPM | SYSTOLIC BLOOD PRESSURE: 103 MMHG | DIASTOLIC BLOOD PRESSURE: 61 MMHG | HEART RATE: 82 BPM | TEMPERATURE: 98 F | OXYGEN SATURATION: 96 %

## 2019-11-13 DIAGNOSIS — Z98.890 OTHER SPECIFIED POSTPROCEDURAL STATES: ICD-10-CM

## 2019-11-13 LAB
ANION GAP SERPL CALC-SCNC: 13 MMOL/L — SIGNIFICANT CHANGE UP (ref 5–17)
BASOPHILS # BLD AUTO: 0.01 K/UL — SIGNIFICANT CHANGE UP (ref 0–0.2)
BASOPHILS NFR BLD AUTO: 0.1 % — SIGNIFICANT CHANGE UP (ref 0–2)
BUN SERPL-MCNC: 9 MG/DL — SIGNIFICANT CHANGE UP (ref 7–23)
CALCIUM SERPL-MCNC: 9.1 MG/DL — SIGNIFICANT CHANGE UP (ref 8.4–10.5)
CHLORIDE SERPL-SCNC: 100 MMOL/L — SIGNIFICANT CHANGE UP (ref 96–108)
CO2 SERPL-SCNC: 26 MMOL/L — SIGNIFICANT CHANGE UP (ref 22–31)
CREAT SERPL-MCNC: 0.84 MG/DL — SIGNIFICANT CHANGE UP (ref 0.5–1.3)
EOSINOPHIL # BLD AUTO: 0 K/UL — SIGNIFICANT CHANGE UP (ref 0–0.5)
EOSINOPHIL NFR BLD AUTO: 0 % — SIGNIFICANT CHANGE UP (ref 0–6)
GLUCOSE SERPL-MCNC: 120 MG/DL — HIGH (ref 70–99)
HCT VFR BLD CALC: 33.5 % — LOW (ref 34.5–45)
HCT VFR BLD CALC: 33.6 % — LOW (ref 34.5–45)
HGB BLD-MCNC: 11.2 G/DL — LOW (ref 11.5–15.5)
HGB BLD-MCNC: 11.3 G/DL — LOW (ref 11.5–15.5)
IMM GRANULOCYTES NFR BLD AUTO: 0.5 % — SIGNIFICANT CHANGE UP (ref 0–1.5)
LYMPHOCYTES # BLD AUTO: 1.26 K/UL — SIGNIFICANT CHANGE UP (ref 1–3.3)
LYMPHOCYTES # BLD AUTO: 8.7 % — LOW (ref 13–44)
MCHC RBC-ENTMCNC: 28.8 PG — SIGNIFICANT CHANGE UP (ref 27–34)
MCHC RBC-ENTMCNC: 33.7 GM/DL — SIGNIFICANT CHANGE UP (ref 32–36)
MCV RBC AUTO: 85.5 FL — SIGNIFICANT CHANGE UP (ref 80–100)
MONOCYTES # BLD AUTO: 1.05 K/UL — HIGH (ref 0–0.9)
MONOCYTES NFR BLD AUTO: 7.2 % — SIGNIFICANT CHANGE UP (ref 2–14)
NEUTROPHILS # BLD AUTO: 12.15 K/UL — HIGH (ref 1.8–7.4)
NEUTROPHILS NFR BLD AUTO: 83.5 % — HIGH (ref 43–77)
NRBC # BLD: 0 /100 WBCS — SIGNIFICANT CHANGE UP (ref 0–0)
PLATELET # BLD AUTO: 303 K/UL — SIGNIFICANT CHANGE UP (ref 150–400)
POTASSIUM SERPL-MCNC: 4.4 MMOL/L — SIGNIFICANT CHANGE UP (ref 3.5–5.3)
POTASSIUM SERPL-SCNC: 4.4 MMOL/L — SIGNIFICANT CHANGE UP (ref 3.5–5.3)
RBC # BLD: 3.92 M/UL — SIGNIFICANT CHANGE UP (ref 3.8–5.2)
RBC # FLD: 13 % — SIGNIFICANT CHANGE UP (ref 10.3–14.5)
SODIUM SERPL-SCNC: 139 MMOL/L — SIGNIFICANT CHANGE UP (ref 135–145)
WBC # BLD: 14.55 K/UL — HIGH (ref 3.8–10.5)
WBC # FLD AUTO: 14.55 K/UL — HIGH (ref 3.8–10.5)

## 2019-11-13 PROCEDURE — 88302 TISSUE EXAM BY PATHOLOGIST: CPT

## 2019-11-13 PROCEDURE — 85027 COMPLETE CBC AUTOMATED: CPT

## 2019-11-13 PROCEDURE — 85014 HEMATOCRIT: CPT

## 2019-11-13 PROCEDURE — C1889: CPT

## 2019-11-13 PROCEDURE — S2900: CPT

## 2019-11-13 PROCEDURE — 86901 BLOOD TYPING SEROLOGIC RH(D): CPT

## 2019-11-13 PROCEDURE — C1781: CPT

## 2019-11-13 PROCEDURE — 88305 TISSUE EXAM BY PATHOLOGIST: CPT

## 2019-11-13 PROCEDURE — 85018 HEMOGLOBIN: CPT

## 2019-11-13 PROCEDURE — 86900 BLOOD TYPING SEROLOGIC ABO: CPT

## 2019-11-13 PROCEDURE — 82962 GLUCOSE BLOOD TEST: CPT

## 2019-11-13 PROCEDURE — 80048 BASIC METABOLIC PNL TOTAL CA: CPT

## 2019-11-13 RX ORDER — SODIUM CHLORIDE 9 MG/ML
3 INJECTION INTRAMUSCULAR; INTRAVENOUS; SUBCUTANEOUS EVERY 8 HOURS
Refills: 0 | Status: DISCONTINUED | OUTPATIENT
Start: 2019-11-13 | End: 2019-11-13

## 2019-11-13 RX ORDER — IBUPROFEN 200 MG
600 TABLET ORAL EVERY 6 HOURS
Refills: 0 | Status: DISCONTINUED | OUTPATIENT
Start: 2019-11-13 | End: 2019-11-13

## 2019-11-13 RX ORDER — TRAMADOL HYDROCHLORIDE 50 MG/1
1 TABLET ORAL
Qty: 8 | Refills: 0
Start: 2019-11-13

## 2019-11-13 RX ORDER — ACETAMINOPHEN 500 MG
3 TABLET ORAL
Qty: 0 | Refills: 0 | DISCHARGE
Start: 2019-11-13

## 2019-11-13 RX ORDER — PANTOPRAZOLE SODIUM 20 MG/1
40 TABLET, DELAYED RELEASE ORAL
Refills: 0 | Status: DISCONTINUED | OUTPATIENT
Start: 2019-11-13 | End: 2019-11-13

## 2019-11-13 RX ADMIN — Medication 600 MILLIGRAM(S): at 12:30

## 2019-11-13 RX ADMIN — Medication 600 MILLIGRAM(S): at 11:37

## 2019-11-13 RX ADMIN — Medication 1000 MILLIGRAM(S): at 00:29

## 2019-11-13 RX ADMIN — Medication 30 MILLIGRAM(S): at 07:02

## 2019-11-13 RX ADMIN — Medication 30 MILLIGRAM(S): at 00:24

## 2019-11-13 RX ADMIN — Medication 975 MILLIGRAM(S): at 15:16

## 2019-11-13 RX ADMIN — Medication 30 MILLIGRAM(S): at 01:29

## 2019-11-13 RX ADMIN — PANTOPRAZOLE SODIUM 40 MILLIGRAM(S): 20 TABLET, DELAYED RELEASE ORAL at 07:57

## 2019-11-13 RX ADMIN — Medication 975 MILLIGRAM(S): at 06:21

## 2019-11-13 NOTE — DISCHARGE NOTE PROVIDER - NSDCCPTREATMENT_GEN_ALL_CORE_FT
PRINCIPAL PROCEDURE  Procedure: Hysterectomy, supracervical, robot-assisted, laparoscopic, for uterus 250 grams or less  Findings and Treatment:       SECONDARY PROCEDURE  Procedure: Creation, midurethral sling, retropubic approach  Findings and Treatment:

## 2019-11-13 NOTE — DISCHARGE NOTE PROVIDER - NSDCMRMEDTOKEN_GEN_ALL_CORE_FT
acetaminophen 325 mg oral tablet: 3 tab(s) orally every 6 hours  ibuprofen 200 mg oral tablet: 3 tab(s) orally every 6 hours  Lipitor:   omeprazole 10 mg oral delayed release capsule:   traMADol 50 mg oral tablet: 1 tab(s) orally every 6 hours, As Needed -for severe pain MDD:4

## 2019-11-13 NOTE — DISCHARGE NOTE NURSING/CASE MANAGEMENT/SOCIAL WORK - PATIENT PORTAL LINK FT
You can access the FollowMyHealth Patient Portal offered by [facility] by registering at the following website: [email or web address]. By joining [employer]’s FollowMyHealth portal, you will also be able to view your health information using other applications (apps) compatible with our system.

## 2019-11-13 NOTE — DISCHARGE NOTE PROVIDER - CARE PROVIDERS DIRECT ADDRESSES
[email or web address]

## 2019-11-13 NOTE — PROGRESS NOTE ADULT - SUBJECTIVE AND OBJECTIVE BOX
R3 Urogyn Note     Patient seen and examined at bedside, no acute overnight events. No acute complaints, reports pain well controlled. She has ambulated and is tolerating regular diet. Has not yet passed flatus. Patel is still in place. Denies CP, SOB, N/V, fevers, and chills.    Vital Signs Last 24 Hours  T(C): 36.8 (11-13-19 @ 05:12), Max: 36.9 (11-12-19 @ 12:05)  HR: 72 (11-13-19 @ 05:12) (64 - 86)  BP: 105/61 (11-13-19 @ 05:12) (105/61 - 132/81)  RR: 18 (11-13-19 @ 05:12) (12 - 19)  SpO2: 93% (11-13-19 @ 05:12) (93% - 100%)    I&O's Summary    12 Nov 2019 07:01  -  13 Nov 2019 06:45  --------------------------------------------------------  IN: 1258 mL / OUT: 1705 mL / NET: -447 mL  UOP overnight: 1685cc       Physical Exam:  General: NAD  CV: RRR  Lungs: CTA-B  Abdomen: Soft, mildly tender, non-distended, +BS  : patel draining clear yellow urine   Incision: five laparoscopic sites CDI, two suprapubic incisions from MUS CDI   Ext: No pain or swelling    Labs:  AM CBC/BMP pending     MEDICATIONS  (STANDING):  acetaminophen   Tablet .. 975 milliGRAM(s) Oral every 6 hours  ketorolac   Injectable 30 milliGRAM(s) IV Push every 6 hours  lactated ringers. 1000 milliLiter(s) (75 mL/Hr) IV Continuous <Continuous>    MEDICATIONS  (PRN):  HYDROmorphone  Injectable 0.5 milliGRAM(s) IV Push every 10 minutes PRN Moderate Pain (4 - 6)  metoclopramide Injectable 10 milliGRAM(s) IV Push once PRN Nausea and/or Vomiting  ondansetron Injectable 4 milliGRAM(s) IV Push once PRN Nausea and/or Vomiting  ondansetron Injectable 4 milliGRAM(s) IV Push every 8 hours PRN Postoperative Nausea and/or Vomiting  traMADol 25 milliGRAM(s) Oral every 6 hours PRN Severe Pain (7 - 10) R3 Urogyn Note     Patient seen and examined at bedside, no acute overnight events. No acute complaints, reports pain well controlled. She has ambulated and is tolerating regular diet. Has not yet passed flatus. Patel is still in place. Denies CP, SOB, N/V, fevers, and chills.    Vital Signs Last 24 Hours  T(C): 36.8 (11-13-19 @ 05:12), Max: 36.9 (11-12-19 @ 12:05)  HR: 72 (11-13-19 @ 05:12) (64 - 86)  BP: 105/61 (11-13-19 @ 05:12) (105/61 - 132/81)  RR: 18 (11-13-19 @ 05:12) (12 - 19)  SpO2: 93% (11-13-19 @ 05:12) (93% - 100%)    I&O's Summary    12 Nov 2019 07:01  -  13 Nov 2019 06:45  --------------------------------------------------------  IN: 1258 mL / OUT: 1705 mL / NET: -447 mL  UOP overnight: 1685cc       Physical Exam:  General: NAD  CV: RRR  Lungs: CTA-B  Abdomen: Soft, mildly tender, non-distended, +BS  : patel draining clear yellow urine   Incision: five laparoscopic sites CDI, two incisions from MUS CDI   Ext: No pain or swelling    Labs:  AM CBC/BMP pending     MEDICATIONS  (STANDING):  acetaminophen   Tablet .. 975 milliGRAM(s) Oral every 6 hours  ketorolac   Injectable 30 milliGRAM(s) IV Push every 6 hours  lactated ringers. 1000 milliLiter(s) (75 mL/Hr) IV Continuous <Continuous>    MEDICATIONS  (PRN):  HYDROmorphone  Injectable 0.5 milliGRAM(s) IV Push every 10 minutes PRN Moderate Pain (4 - 6)  metoclopramide Injectable 10 milliGRAM(s) IV Push once PRN Nausea and/or Vomiting  ondansetron Injectable 4 milliGRAM(s) IV Push once PRN Nausea and/or Vomiting  ondansetron Injectable 4 milliGRAM(s) IV Push every 8 hours PRN Postoperative Nausea and/or Vomiting  traMADol 25 milliGRAM(s) Oral every 6 hours PRN Severe Pain (7 - 10) R3 Urogyn Note     Patient seen and examined at bedside, no acute overnight events. No acute complaints, reports pain moderately controlled overnight. She has not yet ambulated. She tolerated liquids and crackers overnight. Has not yet passed flatus. Patel is still in place. Denies CP, SOB, N/V, fevers, and chills.    Vital Signs Last 24 Hours  T(C): 36.8 (11-13-19 @ 05:12), Max: 36.9 (11-12-19 @ 12:05)  HR: 72 (11-13-19 @ 05:12) (64 - 86)  BP: 105/61 (11-13-19 @ 05:12) (105/61 - 132/81)  RR: 18 (11-13-19 @ 05:12) (12 - 19)  SpO2: 93% (11-13-19 @ 05:12) (93% - 100%)    I&O's Summary    12 Nov 2019 07:01  -  13 Nov 2019 06:45  --------------------------------------------------------  IN: 1258 mL / OUT: 1705 mL / NET: -447 mL  UOP overnight: 1685cc       Physical Exam:  General: NAD  CV: RRR  Lungs: CTA-B  Abdomen: Soft, mildly tender, non-distended, +BS  : patel draining clear yellow urine   Incision: five laparoscopic sites CDI, two incisions from MUS CDI   Ext: No pain or swelling    Labs:  AM CBC/BMP pending     MEDICATIONS  (STANDING):  acetaminophen   Tablet .. 975 milliGRAM(s) Oral every 6 hours  ketorolac   Injectable 30 milliGRAM(s) IV Push every 6 hours  lactated ringers. 1000 milliLiter(s) (75 mL/Hr) IV Continuous <Continuous>    MEDICATIONS  (PRN):  HYDROmorphone  Injectable 0.5 milliGRAM(s) IV Push every 10 minutes PRN Moderate Pain (4 - 6)  metoclopramide Injectable 10 milliGRAM(s) IV Push once PRN Nausea and/or Vomiting  ondansetron Injectable 4 milliGRAM(s) IV Push once PRN Nausea and/or Vomiting  ondansetron Injectable 4 milliGRAM(s) IV Push every 8 hours PRN Postoperative Nausea and/or Vomiting  traMADol 25 milliGRAM(s) Oral every 6 hours PRN Severe Pain (7 - 10)

## 2019-11-13 NOTE — DISCHARGE NOTE PROVIDER - NSDCFUADDINST_GEN_ALL_CORE_FT
Take tylenol and motrin for pain control. Take tramadol for severe pain. Do not operate heavy machinery or drive while taking tramadol. Follow up with Dr. Dodge in 2 weeks. Call your doctor if you experience fever (>100.4), pain uncontrolled with medications, unable to urinate or tolerate food or liquid. Call your doctor for any concerns. Postoperative Instructions    Bowel regimen    To avoid constipation and straining, we suggest the following (simultaneously):  1. Colace (stool softener) 100mg, one pill three times a day (breakfast, lunch, dinner). If stool is too soft, decrease to twice a day (breakfast, dinner)  If still constipated, you can add: Miralax once at bedtime  All of these agents can be obtained over the counter at the pharmacy.      Pain control.    For pain control, take the followin.  Motrin 800mg three times a day, take with food  2.  Add Tylenol as needed if still have pain despite Motrin  Motrin and Tylenol can be obtained over the counter.      Postoperative urinary catheter    If you failed your voiding trial in the hospital and are sent home with a catheter, please call the office ([phone]) so you can come in to have a repeat voiding trial/catheter removal in a few days.      Postoperative restrictions    Nothing in the vagina (tampons, sexual intercourse), No tub baths, pools or hot tubs for 6 weeks (showers are ok!)  No lifting anything heavier than 10 lbs, no strenuous exercise for 2 weeks after surgery. Do not pull or cut any stitches that you see around your incision.      Vaginal bleeding    Spotting and intermittent passage of blood clots per vagina is normal in first few weeks after surgery. If you are soaking 1 pad per hour, that is not normal and you should notify my office and seek medical attention right away.      Vaginal discharge    Vaginal discharge (all colors) is normal after vaginal surgery. If you’ve had vaginal surgery, you have sutures in your vagina which take 3 months to fully absorb. You may have vaginal discharge during this time. This is normal.

## 2019-11-13 NOTE — DISCHARGE NOTE PROVIDER - CARE PROVIDER_API CALL
Carlos Dodge (MD)  Female Pelvic MedReconst Surg; Obstetrics and Gynecology  [address]  [place] [address]  Phone: [phone]  Fax: [phone]  Follow Up Time: 2 weeks

## 2019-11-13 NOTE — PROGRESS NOTE ADULT - PROBLEM SELECTOR PLAN 1
Neuro: transition to po pain meds  CV: Hemodynamically stable, AM CBC pending   Pulm: Saturating well on room air, encourage incentive spirometry  GI: Continue regular diet  : UOP adequate, plan for AM TOV. AM BMP pending.   Heme: c/w early ambulation and SCDs for DVT ppx  Dispo: Continue routine post-op care, plan for AM TOV with discharge planning.     SUMAYA Sanchez, PGY-3 Neuro: transition to po pain meds  CV: Hemodynamically stable, AM CBC pending   Pulm: Saturating well on room air, encourage incentive spirometry  GI: Continue regular diet. Continue home omeprazole.   : UOP adequate, plan for AM TOV. AM BMP pending.   Heme: c/w early ambulation and SCDs for DVT ppx  Dispo: Continue routine post-op care, plan for AM TOV with discharge planning.     SUMAYA Sanchez, PGY-3

## 2019-11-13 NOTE — PROGRESS NOTE ADULT - ASSESSMENT
55yo POD#1 s/p RA LSC Supracervical Hysterectomy, Bilateral salpingectomy, sacrocolpopexy, midurethral sling for stage III POP and stress incontinence, in stable condition. 53yo POD#1 s/p RA LSC Supracervical Hysterectomy, Bilateral salpingectomy, sacrocolpopexy, midurethral sling and cystoscopy for stage III POP and stress incontinence, in stable condition.

## 2019-11-13 NOTE — DISCHARGE NOTE NURSING/CASE MANAGEMENT/SOCIAL WORK - NSDCPNINST_GEN_ALL_CORE
Bai / rufina-care as instructed.  Notify MD s/s of infection, increased pain, n/v increased swelling or any questions or concerns.

## 2019-11-13 NOTE — DISCHARGE NOTE PROVIDER - HOSPITAL COURSE
55 y/o s/p robotic supracervical hysterectomy, b/l salpingectomy, sacral colpopexy, midurethral sling, cysto    EBL: 125. Hct:41.6 POD #0, pt was advanced to a regular diet. POD#1 patient underwent a TOV and passed. At time of discharge, pt was in stable condition, ambulating, tolerating po and voiding spontaneously. Pt to have close f/u w/ Dr. Dodge in clinic. 53 y/o s/p robotic supracervical hysterectomy, b/l salpingectomy, sacral colpopexy, midurethral sling, cysto    EBL: 125. Hct:41.6 -> 33.5->  POD #0, pt was advanced to a regular diet. POD#1 patient underwent a TOV and was only able to void 75cc. Bai catheter replaced. Pt to go home with Bai and will F/U in Dr. Dodge's office on Friday, 11/15/19.  At time of discharge, pt was in stable condition, ambulating, and tolerating po. Pt to have close f/u w/ Dr. Dodge in clinic. 55 y/o s/p robotic supracervical hysterectomy, b/l salpingectomy, sacral colpopexy, midurethral sling, cysto    EBL: 125. Hct:41.6 -> 33.5->  33.6. POD #0, pt was advanced to a regular diet. POD#1 patient underwent a TOV and was only able to void 75cc. Bai catheter replaced. Pt to go home with Bai and will F/U in Dr. Dodge's office on Friday, 11/15/19.  At time of discharge, pt was in stable condition, ambulating, and tolerating po. Pt to have close f/u w/ Dr. Dodge in clinic, repeat TOV in office on Friday, 11/15.

## 2019-11-13 NOTE — CHART NOTE - NSCHARTNOTEFT_GEN_A_CORE
TOV Note    Active Trial of Void performed.   300cc NS instilled into the bladder by gravity.  Patel D/C'd (resistance removing patel by urethral meatus, counter pressure to meatus applied and dislodged- Dr. Mynor Brand and Dar aware. After removing Patel, balloon re-inflated w/ 10cc and checked to make sure intact)  Pt only able to void 75cc   [place] 14 Patel placed by Dr. Sanchez  w/o difficulty  Pt to return to Dr. Ralph office on Friday 11/15/2019 for repeat TOV. Pt aware.

## 2019-11-13 NOTE — DISCHARGE NOTE PROVIDER - NSDCCPCAREPLAN_GEN_ALL_CORE_FT
PRINCIPAL DISCHARGE DIAGNOSIS  Diagnosis: Postoperative state  Assessment and Plan of Treatment: Postoperative state

## 2019-11-14 PROBLEM — N81.4 UTEROVAGINAL PROLAPSE, UNSPECIFIED: Chronic | Status: ACTIVE | Noted: 2019-10-30

## 2019-11-14 PROBLEM — J32.9 CHRONIC SINUSITIS, UNSPECIFIED: Chronic | Status: ACTIVE | Noted: 2019-10-30

## 2019-11-15 ENCOUNTER — APPOINTMENT (OUTPATIENT)
Dept: UROGYNECOLOGY | Facility: CLINIC | Age: 54
End: 2019-11-15
Payer: COMMERCIAL

## 2019-11-15 DIAGNOSIS — N81.11 CYSTOCELE, MIDLINE: ICD-10-CM

## 2019-11-15 DIAGNOSIS — N81.6 RECTOCELE: ICD-10-CM

## 2019-11-15 DIAGNOSIS — N39.3 STRESS INCONTINENCE (FEMALE) (MALE): ICD-10-CM

## 2019-11-15 DIAGNOSIS — N81.2 INCOMPLETE UTEROVAGINAL PROLAPSE: ICD-10-CM

## 2019-11-15 PROCEDURE — 99024 POSTOP FOLLOW-UP VISIT: CPT

## 2019-11-15 NOTE — DISCUSSION/SUMMARY
[Post-Op instructions given. Pt/family verbalizes understanding] : post-operative instructions were provided to the patient/family who verbalize understanding [FreeTextEntry1] : Discussed AZO PRN.  Advised adequate fluid intake.  If unable to urinate or no urination in 8 hours, pt aware she would need to be evaluated in ED.  POA 11/25.  Instructed to call with any questions or concerns and she verbalizes understanding.\par

## 2019-11-15 NOTE — SUBJECTIVE
[FreeTextEntry1] : overall doing well [FreeTextEntry7] : mild, taking motrin with relief [FreeTextEntry8] : none new [FreeTextEntry6] : normal [FreeTextEntry4] : taking coalce + BM [FreeTextEntry5] : catheter in place [FreeTextEntry3] : normal [FreeTextEntry2] : normal

## 2019-11-15 NOTE — OBJECTIVE
[Voiding Trial] : Voiding trial was performed [Soft and Nontender] : soft and nontender [Clean, Dry, Intact] : Clean, Dry, Intact [FreeTextEntry3] : deferred

## 2019-11-18 ENCOUNTER — APPOINTMENT (OUTPATIENT)
Dept: UROGYNECOLOGY | Facility: CLINIC | Age: 54
End: 2019-11-18
Payer: COMMERCIAL

## 2019-11-18 DIAGNOSIS — Z87.440 PERSONAL HISTORY OF URINARY (TRACT) INFECTIONS: ICD-10-CM

## 2019-11-18 LAB
BILIRUB UR QL STRIP: NORMAL
CLARITY UR: CLEAR
GLUCOSE UR-MCNC: NORMAL
HCG UR QL: 0.2 EU/DL
HGB UR QL STRIP.AUTO: NORMAL
KETONES UR-MCNC: NORMAL
LEUKOCYTE ESTERASE UR QL STRIP: NORMAL
NITRITE UR QL STRIP: NORMAL
PH UR STRIP: 7
PROT UR STRIP-MCNC: NORMAL
SP GR UR STRIP: 1.01

## 2019-11-18 PROCEDURE — 99024 POSTOP FOLLOW-UP VISIT: CPT | Mod: GC

## 2019-11-18 NOTE — OBJECTIVE
[Post Void Residual ____ ml] : Post Void Residual was [unfilled] ml [Good Support] : Good support [Healing well] : healing well [FreeTextEntry3] : Sterile cath performed to rule out UTI.

## 2019-11-18 NOTE — DISCUSSION/SUMMARY
[FreeTextEntry1] : 55 yo s/p RA-KYMBERLY/BS, SC, retropubic MUS and cysto, POD 6. Urine dip demonstrated nitrites, leuk and rbcs. Bactrim DS sent to pharmacy. Patient to follow up in 3-4 weeks.Postoperative restricstions reviewed.  All questions and concerns answered. [Risks/Benefits discussed. Pt/family verbalizes understanding] : risks and benefits of the procedure were discussed with the patient/family who verbalize understanding [Post-Op instructions given. Pt/family verbalizes understanding] : post-operative instructions were provided to the patient/family who verbalize understanding

## 2019-11-18 NOTE — SUBJECTIVE
[FreeTextEntry1] : NAD [FreeTextEntry8] : None [FreeTextEntry7] : Report dysuria [FreeTextEntry6] : Normal [FreeTextEntry5] : Reports urgency. Denies frequency or AMARILYS. Denies incomplete emptying [FreeTextEntry4] : Normal [FreeTextEntry3] : Normal [FreeTextEntry2] : Normal

## 2019-11-21 ENCOUNTER — MEDICATION RENEWAL (OUTPATIENT)
Age: 54
End: 2019-11-21

## 2019-11-21 LAB
BACTERIA UR CULT: ABNORMAL
SURGICAL PATHOLOGY STUDY: SIGNIFICANT CHANGE UP

## 2019-11-25 ENCOUNTER — MESSAGE (OUTPATIENT)
Age: 54
End: 2019-11-25

## 2019-11-26 LAB — BACTERIA UR CULT: NORMAL

## 2019-12-09 ENCOUNTER — APPOINTMENT (OUTPATIENT)
Dept: UROGYNECOLOGY | Facility: CLINIC | Age: 54
End: 2019-12-09
Payer: COMMERCIAL

## 2019-12-09 PROCEDURE — 99024 POSTOP FOLLOW-UP VISIT: CPT | Mod: GC

## 2019-12-10 NOTE — ED PROVIDER NOTE - PSYCHIATRIC, MLM
Endocrinology Alert and oriented to person, place, time/situation. normal mood and affect. no apparent risk to self or others.

## 2019-12-12 NOTE — OBJECTIVE
[Soft and Nontender] : soft and nontender [Clean, Dry, Intact] : Clean, Dry, Intact [Good Support] : Good support [Healing well] : healing well [FreeTextEntry3] : suture present

## 2019-12-12 NOTE — SUBJECTIVE
[FreeTextEntry8] : Occasional ibuprofen [FreeTextEntry1] : No acute distress [FreeTextEntry6] : Normal [FreeTextEntry7] : None [FreeTextEntry5] : No leakage, no dysuria, no hematuria.  [FreeTextEntry4] : Occasional constipation [FreeTextEntry3] : Normal [FreeTextEntry2] : Normal

## 2019-12-12 NOTE — DISCUSSION/SUMMARY
[FreeTextEntry1] : \par Patient is s/p RASCH, bilateral salpingectomy, SCP, sling on 11/21/19. She had a UTI that was treated with Bactrim and then Nitrofurantoin with resolution of her symptoms. She presents for follow up today and is doing well. We reviewed postoperative instructions and use of colace/miralax to avoid constipation. Pathology benign and reviewed with patient. F/u in 4 weeks.

## 2020-01-06 ENCOUNTER — APPOINTMENT (OUTPATIENT)
Dept: UROGYNECOLOGY | Facility: CLINIC | Age: 55
End: 2020-01-06
Payer: COMMERCIAL

## 2020-01-06 PROCEDURE — 99024 POSTOP FOLLOW-UP VISIT: CPT

## 2020-01-06 NOTE — DISCUSSION/SUMMARY
[FreeTextEntry1] : Pt doing well. She will RTO if abdominal pain worsens or occurs more frequently.  She will RTO in 1 year or sooner if needed. Pt agrees to call office with any problems/concerns.

## 2020-01-06 NOTE — SUBJECTIVE
[FreeTextEntry1] : good [FreeTextEntry5] : denies incontinence or retention [FreeTextEntry6] : normal [FreeTextEntry7] : occasional dull pain on LLQ on abdomen.  Progressively improving. Pain does  not stop pt from performing normal daily activities.  [FreeTextEntry2] : normal [FreeTextEntry4] : occasional constipation. pt takes stool softener only PRN. [FreeTextEntry3] : normal

## 2020-01-16 ENCOUNTER — APPOINTMENT (OUTPATIENT)
Dept: INTERNAL MEDICINE | Facility: CLINIC | Age: 55
End: 2020-01-16
Payer: COMMERCIAL

## 2020-01-16 VITALS
OXYGEN SATURATION: 97 % | HEIGHT: 66.5 IN | DIASTOLIC BLOOD PRESSURE: 80 MMHG | RESPIRATION RATE: 14 BRPM | WEIGHT: 205 LBS | SYSTOLIC BLOOD PRESSURE: 122 MMHG | TEMPERATURE: 98.2 F | BODY MASS INDEX: 32.56 KG/M2 | HEART RATE: 74 BPM

## 2020-01-16 DIAGNOSIS — R21 RASH AND OTHER NONSPECIFIC SKIN ERUPTION: ICD-10-CM

## 2020-01-16 DIAGNOSIS — Z80.49 FAMILY HISTORY OF MALIGNANT NEOPLASM OF OTHER GENITAL ORGANS: ICD-10-CM

## 2020-01-16 PROCEDURE — 99203 OFFICE O/P NEW LOW 30 MIN: CPT

## 2020-01-16 RX ORDER — NITROFURANTOIN (MONOHYDRATE/MACROCRYSTALS) 25; 75 MG/1; MG/1
100 CAPSULE ORAL
Qty: 10 | Refills: 0 | Status: DISCONTINUED | COMMUNITY
Start: 2019-11-21 | End: 2020-01-16

## 2020-01-16 RX ORDER — SULFAMETHOXAZOLE AND TRIMETHOPRIM 800; 160 MG/1; MG/1
800-160 TABLET ORAL TWICE DAILY
Qty: 6 | Refills: 0 | Status: DISCONTINUED | COMMUNITY
Start: 2019-11-18 | End: 2020-01-16

## 2020-01-16 NOTE — PHYSICAL EXAM
[Normal] : affect was normal and insight and judgment were intact [de-identified] : upper chest with dry skin

## 2020-01-16 NOTE — HISTORY OF PRESENT ILLNESS
[FreeTextEntry1] : establish care [de-identified] : Pt had uterine prolapse repair last year. \par Has hyperlipidemia on atorvastatin. \par c/o rough itchy skin on upper chest. Has been using moisturizer.

## 2020-01-21 LAB
25(OH)D3 SERPL-MCNC: 20.8 NG/ML
ALBUMIN SERPL ELPH-MCNC: 4.7 G/DL
ALP BLD-CCNC: 107 U/L
ALT SERPL-CCNC: 23 U/L
ANION GAP SERPL CALC-SCNC: 15 MMOL/L
AST SERPL-CCNC: 20 U/L
BASOPHILS # BLD AUTO: 0.07 K/UL
BASOPHILS NFR BLD AUTO: 1.1 %
BILIRUB SERPL-MCNC: 0.3 MG/DL
BUN SERPL-MCNC: 12 MG/DL
CALCIUM SERPL-MCNC: 10 MG/DL
CHLORIDE SERPL-SCNC: 105 MMOL/L
CHOLEST SERPL-MCNC: 211 MG/DL
CHOLEST/HDLC SERPL: 2.8 RATIO
CO2 SERPL-SCNC: 26 MMOL/L
CREAT SERPL-MCNC: 0.87 MG/DL
EOSINOPHIL # BLD AUTO: 0.4 K/UL
EOSINOPHIL NFR BLD AUTO: 6.4 %
ESTIMATED AVERAGE GLUCOSE: 114 MG/DL
FOLATE SERPL-MCNC: 7.5 NG/ML
GLUCOSE SERPL-MCNC: 116 MG/DL
HBA1C MFR BLD HPLC: 5.6 %
HCT VFR BLD CALC: 40.8 %
HDLC SERPL-MCNC: 75 MG/DL
HGB BLD-MCNC: 12.9 G/DL
IMM GRANULOCYTES NFR BLD AUTO: 0.2 %
LDLC SERPL CALC-MCNC: 103 MG/DL
LYMPHOCYTES # BLD AUTO: 1.87 K/UL
LYMPHOCYTES NFR BLD AUTO: 30 %
MAN DIFF?: NORMAL
MCHC RBC-ENTMCNC: 28.5 PG
MCHC RBC-ENTMCNC: 31.6 GM/DL
MCV RBC AUTO: 90.1 FL
MONOCYTES # BLD AUTO: 0.55 K/UL
MONOCYTES NFR BLD AUTO: 8.8 %
NEUTROPHILS # BLD AUTO: 3.33 K/UL
NEUTROPHILS NFR BLD AUTO: 53.5 %
PLATELET # BLD AUTO: 376 K/UL
POTASSIUM SERPL-SCNC: 4.8 MMOL/L
PROT SERPL-MCNC: 7.4 G/DL
RBC # BLD: 4.53 M/UL
RBC # FLD: 12.5 %
SODIUM SERPL-SCNC: 146 MMOL/L
TRIGL SERPL-MCNC: 166 MG/DL
TSH SERPL-ACNC: 1.85 UIU/ML
VIT B12 SERPL-MCNC: 520 PG/ML
WBC # FLD AUTO: 6.23 K/UL

## 2020-01-23 ENCOUNTER — APPOINTMENT (OUTPATIENT)
Dept: FAMILY MEDICINE | Facility: CLINIC | Age: 55
End: 2020-01-23

## 2020-07-01 ENCOUNTER — RX RENEWAL (OUTPATIENT)
Age: 55
End: 2020-07-01

## 2020-12-21 PROBLEM — Z87.440 HISTORY OF URINARY TRACT INFECTION: Status: RESOLVED | Noted: 2019-11-18 | Resolved: 2020-12-21

## 2021-01-07 ENCOUNTER — RX RENEWAL (OUTPATIENT)
Age: 56
End: 2021-01-07

## 2021-02-22 ENCOUNTER — RX RENEWAL (OUTPATIENT)
Age: 56
End: 2021-02-22

## 2021-08-17 ENCOUNTER — RX RENEWAL (OUTPATIENT)
Age: 56
End: 2021-08-17

## 2022-01-10 NOTE — ED PROVIDER NOTE - HISTORY ATTESTATION, MLM
Is This Lesion Biopsy Proven?: Yes How Severe Are They?: moderate Is This A New Presentation, Or A Follow-Up?: Actinic Keratosis I have reviewed and confirmed nurses' notes...

## 2022-03-08 ENCOUNTER — APPOINTMENT (OUTPATIENT)
Dept: CARDIOLOGY | Facility: CLINIC | Age: 57
End: 2022-03-08
Payer: COMMERCIAL

## 2022-03-08 ENCOUNTER — NON-APPOINTMENT (OUTPATIENT)
Age: 57
End: 2022-03-08

## 2022-03-08 VITALS
HEIGHT: 66.5 IN | HEART RATE: 82 BPM | OXYGEN SATURATION: 97 % | BODY MASS INDEX: 31.76 KG/M2 | DIASTOLIC BLOOD PRESSURE: 74 MMHG | WEIGHT: 200 LBS | SYSTOLIC BLOOD PRESSURE: 118 MMHG

## 2022-03-08 VITALS
WEIGHT: 200 LBS | OXYGEN SATURATION: 97 % | HEIGHT: 66.5 IN | SYSTOLIC BLOOD PRESSURE: 118 MMHG | DIASTOLIC BLOOD PRESSURE: 74 MMHG | HEART RATE: 82 BPM | BODY MASS INDEX: 31.76 KG/M2

## 2022-03-08 DIAGNOSIS — Z98.890 OTHER SPECIFIED POSTPROCEDURAL STATES: ICD-10-CM

## 2022-03-08 DIAGNOSIS — I49.49 OTHER PREMATURE DEPOLARIZATION: ICD-10-CM

## 2022-03-08 PROCEDURE — 99244 OFF/OP CNSLTJ NEW/EST MOD 40: CPT

## 2022-03-08 PROCEDURE — 93000 ELECTROCARDIOGRAM COMPLETE: CPT

## 2022-03-08 NOTE — DISCUSSION/SUMMARY
[FreeTextEntry1] : \par Palpitations:  Increasing frequency of palpitations (mildly symptomatic); she reports an abnormal ECG with her primary care physician with "skipped beats" -- this tracing is not available for review at the time of today's visit but today's electrocardiogram is normal.  I recommend 5 day ambulatory ECG monitor to assess for arrhythmia.  She had a normal nuclear stress test in January 2017 (completed 10 minutes of the Augusto protocol -- normal scan, abnormal stress ECG); no anginal symptoms.\par \par Mitral valve insufficiency: Echocardiography in 2018 describes the presence of thickened mitral leaflets and insufficiency; I recommend echocardiography to assess for interval change in valve function given present symptoms.\par \par Hypercholesterolemia: Controlled; continue atorvastatin 20 mg daily.

## 2022-03-08 NOTE — HISTORY OF PRESENT ILLNESS
[FreeTextEntry1] : Maura Elias is a 56-year-old woman with a history of hypercholesterolemia, mild mitral valve regurgitation, acid reflux, palpitations, family history of heart disease (not premature) who presents for cardiology consultation - she had been under my care several years ago (last seen in March 2018).  She has been feeling well - describes mild COVID-19 infection several months ago and fully recovered.  She returns because of intermittent palpitations and findings of "skipping" on a recent ECG at her primary care physician's office.  She describes sporadic palpitations -- they used to occur exclusively at night but now they occur at various times over the course of the day; unable to identify any clear exacerbating or alleviating factors. She admits to caffeine consult -- sodium and chocolate.  She otherwise has been feeling well; no chest pain or syncope.

## 2022-03-08 NOTE — PHYSICAL EXAM
[Normal S1, S2] : normal S1, S2 [Clear Lung Fields] : clear lung fields [Soft] : abdomen soft [Normal Bowel Sounds] : normal bowel sounds [Normal Gait] : normal gait [No Edema] : no edema [No Rash] : no rash [Moves all extremities] : moves all extremities [Alert and Oriented] : alert and oriented [de-identified] : Overweight [de-identified] : Extraocular muscles intact [de-identified] : wearing a face mask [de-identified] : No JVD

## 2022-03-17 ENCOUNTER — APPOINTMENT (OUTPATIENT)
Dept: CARDIOLOGY | Facility: CLINIC | Age: 57
End: 2022-03-17
Payer: COMMERCIAL

## 2022-03-17 PROCEDURE — 93306 TTE W/DOPPLER COMPLETE: CPT

## 2022-03-23 ENCOUNTER — NON-APPOINTMENT (OUTPATIENT)
Age: 57
End: 2022-03-23

## 2022-04-20 ENCOUNTER — APPOINTMENT (OUTPATIENT)
Dept: GASTROENTEROLOGY | Facility: CLINIC | Age: 57
End: 2022-04-20
Payer: COMMERCIAL

## 2022-04-20 VITALS
HEIGHT: 66.5 IN | SYSTOLIC BLOOD PRESSURE: 118 MMHG | WEIGHT: 200 LBS | DIASTOLIC BLOOD PRESSURE: 82 MMHG | BODY MASS INDEX: 31.76 KG/M2 | HEART RATE: 72 BPM | OXYGEN SATURATION: 98 %

## 2022-04-20 DIAGNOSIS — R12 HEARTBURN: ICD-10-CM

## 2022-04-20 DIAGNOSIS — R10.13 EPIGASTRIC PAIN: ICD-10-CM

## 2022-04-20 DIAGNOSIS — K58.9 IRRITABLE BOWEL SYNDROME W/OUT DIARRHEA: ICD-10-CM

## 2022-04-20 DIAGNOSIS — K76.0 FATTY (CHANGE OF) LIVER, NOT ELSEWHERE CLASSIFIED: ICD-10-CM

## 2022-04-20 PROCEDURE — 99204 OFFICE O/P NEW MOD 45 MIN: CPT

## 2022-04-20 NOTE — HISTORY OF PRESENT ILLNESS
[de-identified] : Corine Mitchell MD\par [address] [facility]\[place] [address] \par \par Pleasant 57-year-old female\par \par Brother had esophageal cancer\par \par She has chronic acid reflux\par \par History of Schatzki ring\par \par No current dysphagia\par \par Heartburn controlled with omeprazole\par \par Having epigastric and right upper quadrant discomfort\par \par Particularly after eating\par \par Lasting for 40 minutes\par \par No nausea, vomiting, fever\par \par Last colonoscopy over 5 years ago, but she says it was normal and has no family history of colon cancer\par \par No change in bowel habits

## 2022-04-20 NOTE — CONSULT LETTER
[Dear  ___] : Dear  [unfilled], [Consult Letter:] : I had the pleasure of evaluating your patient, [unfilled]. [Please see my note below.] : Please see my note below. [Consult Closing:] : Thank you very much for allowing me to participate in the care of this patient.  If you have any questions, please do not hesitate to contact me. [Sincerely,] : Sincerely, [FreeTextEntry2] : Corine Mitchell MD\par [address] [facility]\par [facility] \par \par  [FreeTextEntry3] : Cisco Freitas MD\par

## 2022-04-20 NOTE — REASON FOR VISIT
[FreeTextEntry1] : Epigastric and right upper quadrant discomfort, brother with esophageal cancer, GERD

## 2022-05-07 ENCOUNTER — APPOINTMENT (OUTPATIENT)
Dept: ULTRASOUND IMAGING | Facility: CLINIC | Age: 57
End: 2022-05-07
Payer: COMMERCIAL

## 2022-05-07 ENCOUNTER — OUTPATIENT (OUTPATIENT)
Dept: OUTPATIENT SERVICES | Facility: HOSPITAL | Age: 57
LOS: 1 days | End: 2022-05-07
Payer: COMMERCIAL

## 2022-05-07 DIAGNOSIS — K76.0 FATTY (CHANGE OF) LIVER, NOT ELSEWHERE CLASSIFIED: ICD-10-CM

## 2022-05-07 DIAGNOSIS — Z00.8 ENCOUNTER FOR OTHER GENERAL EXAMINATION: ICD-10-CM
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
## 2022-05-07 DIAGNOSIS — Z98.51 TUBAL LIGATION STATUS: Chronic | ICD-10-CM

## 2022-05-07 PROCEDURE — 76700 US EXAM ABDOM COMPLETE: CPT | Mod: 26

## 2022-05-07 PROCEDURE — 76700 US EXAM ABDOM COMPLETE: CPT

## 2022-05-09 ENCOUNTER — NON-APPOINTMENT (OUTPATIENT)
Age: 57
End: 2022-05-09

## 2022-11-10 ENCOUNTER — NON-APPOINTMENT (OUTPATIENT)
Age: 57
End: 2022-11-10

## 2022-11-13 ENCOUNTER — APPOINTMENT (OUTPATIENT)
Dept: GASTROENTEROLOGY | Facility: AMBULATORY MEDICAL SERVICES | Age: 57
End: 2022-11-13

## 2022-11-13 DIAGNOSIS — K22.10 ULCER OF ESOPHAGUS W/OUT BLEEDING: ICD-10-CM

## 2022-11-13 PROCEDURE — 43239 EGD BIOPSY SINGLE/MULTIPLE: CPT

## 2022-11-17 ENCOUNTER — APPOINTMENT (OUTPATIENT)
Dept: ORTHOPEDIC SURGERY | Facility: CLINIC | Age: 57
End: 2022-11-17

## 2022-11-17 ENCOUNTER — FORM ENCOUNTER (OUTPATIENT)
Age: 57
End: 2022-11-17

## 2022-11-17 VITALS — WEIGHT: 205 LBS | BODY MASS INDEX: 32.95 KG/M2 | HEIGHT: 66 IN

## 2022-11-17 PROCEDURE — 99204 OFFICE O/P NEW MOD 45 MIN: CPT

## 2022-11-17 PROCEDURE — 73600 X-RAY EXAM OF ANKLE: CPT | Mod: LT

## 2022-11-17 PROCEDURE — 73630 X-RAY EXAM OF FOOT: CPT | Mod: LT

## 2022-11-17 NOTE — PHYSICAL EXAM
[Left] : left foot and ankle [] : no achilles tendon tenderness [NL (40)] : plantar flexion 40 degrees [NL 30)] : inversion 30 degrees [NL (20)] : eversion 20 degrees [5___] : eversion 5[unfilled]/5 [TWNoteComboBox7] : dorsiflexion 15 degrees

## 2022-11-17 NOTE — HISTORY OF PRESENT ILLNESS
[de-identified] : 11/17/2022: s/p ankle fx 1 year ago tx by outside md. having continued pain and swelling. no PT. no recent injury. no recent treatment. no prior sig ankle prob. 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

## 2022-11-17 NOTE — IMAGING
[Left] : left calcaneus [FreeTextEntry9] : nonunited lat mall avulsion fragments; ?navic fx as well [de-identified] : nonunited lat mall avulsion fragments; ?navic fx as well

## 2022-11-17 NOTE — ASSESSMENT
[FreeTextEntry1] : wbat\par ice/elevate\par nsaids prn\par mri to eval continued pain/swelling and nonunited fragments >1 year from injury

## 2022-11-18 ENCOUNTER — APPOINTMENT (OUTPATIENT)
Dept: MRI IMAGING | Facility: CLINIC | Age: 57
End: 2022-11-18

## 2022-11-18 PROCEDURE — 73721 MRI JNT OF LWR EXTRE W/O DYE: CPT | Mod: LT

## 2022-11-21 ENCOUNTER — NON-APPOINTMENT (OUTPATIENT)
Age: 57
End: 2022-11-21

## 2022-12-01 ENCOUNTER — APPOINTMENT (OUTPATIENT)
Dept: ORTHOPEDIC SURGERY | Facility: CLINIC | Age: 57
End: 2022-12-01

## 2022-12-01 VITALS — WEIGHT: 205 LBS | HEIGHT: 66 IN | BODY MASS INDEX: 32.95 KG/M2

## 2022-12-01 DIAGNOSIS — M19.072 PRIMARY OSTEOARTHRITIS, LEFT ANKLE AND FOOT: ICD-10-CM

## 2022-12-01 DIAGNOSIS — S82.62XK DISPLACED FRACTURE OF LATERAL MALLEOLUS OF LEFT FIBULA, SUBSEQUENT ENCOUNTER FOR CLOSED FRACTURE WITH NONUNION: ICD-10-CM

## 2022-12-01 PROCEDURE — 99214 OFFICE O/P EST MOD 30 MIN: CPT

## 2022-12-01 NOTE — IMAGING
[Left] : left calcaneus [FreeTextEntry9] : nonunited lat mall avulsion fragments; ?navic fx as well [de-identified] : nonunited lat mall avulsion fragments; ?navic fx as well

## 2022-12-01 NOTE — HISTORY OF PRESENT ILLNESS
[de-identified] : 11/17/2022: s/p ankle fx 1 year ago tx by outside md. having continued pain and swelling. no PT. no recent injury. no recent treatment. no prior sig ankle prob. 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 no sig change. walking in regular shoes.  [] : no [FreeTextEntry1] : left ankle

## 2022-12-01 NOTE — PHYSICAL EXAM
[Left] : left foot and ankle [NL (40)] : plantar flexion 40 degrees [NL 30)] : inversion 30 degrees [NL (20)] : eversion 20 degrees [5___] : eversion 5[unfilled]/5 [2+] : dorsalis pedis pulse: 2+ [] : no achilles tendon tenderness [TWNoteComboBox7] : dorsiflexion 15 degrees

## 2022-12-01 NOTE — ASSESSMENT
[FreeTextEntry1] : wbat\par ice/elevate\par nsaids prn\par discussed csi in area of lateral ligaments\par discussed surgical treatment in future if fails to improve\par declined airsport\par f/up 2-3 months

## 2022-12-01 NOTE — DATA REVIEWED
[MRI] : MRI [Left] : left [Foot] : foot [I reviewed the films/CD and additionally noted] : I reviewed the films/CD and additionally noted [FreeTextEntry1] : nonunited lat mall fx w/ bone edema and ganlion. TN joint arthritis

## 2022-12-12 ENCOUNTER — APPOINTMENT (OUTPATIENT)
Dept: ORTHOPEDIC SURGERY | Facility: CLINIC | Age: 57
End: 2022-12-12

## 2022-12-12 VITALS — HEIGHT: 66 IN | BODY MASS INDEX: 32.95 KG/M2 | WEIGHT: 205 LBS

## 2022-12-12 DIAGNOSIS — M54.50 LOW BACK PAIN, UNSPECIFIED: ICD-10-CM

## 2022-12-12 DIAGNOSIS — G89.29 LOW BACK PAIN, UNSPECIFIED: ICD-10-CM

## 2022-12-12 PROCEDURE — 99213 OFFICE O/P EST LOW 20 MIN: CPT

## 2022-12-12 PROCEDURE — 72110 X-RAY EXAM L-2 SPINE 4/>VWS: CPT

## 2022-12-12 NOTE — ASSESSMENT
[FreeTextEntry1] : 57 year old F with chronic LBP.  Currently taking advil with no relief.  .  Has failed multiple rounds of PT in past.  Has been doing current HEP with no relief. \par \par MRI L spine\par Massage therapist referral\par FU after MRI\par Likely referral to pain management for CARLOS
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
## 2022-12-12 NOTE — IMAGING
[de-identified] : Spine:\par Inspection/Palpation:\par No tenderness to palpation throughout Cervical/thoracic/lumbar spine.\par No bony stepoffs, No lesions.\par \par Gait:\par Non-antalgic, able to perform bilateral toe and heel rise.  Able to perform tandem gait.  \par \par Range of Motion:\par Lumbar Spine: Flexion to 90 degrees, Extension to 30 degrees, rotation 30 degrees bilaterally, lateral flexion to 30 degrees bilaterally.\par \par Neurologic:\par \par Bilateral Lower Extremities 5/5 Iliopsoas/Quadriceps/Hamstrings/ Tibialis Anterior/ Gastrocnemius. Extensor Hallucis Longus/ Flexor Hallucis Longus except \par \par \par Sensation intact to light touch L2-S1\par \par Patellar/ Achilles reflex within normal limits.\par \par \par Negative straight leg raise\par \par No pain with IR/ER/Flexion of HIps bilaterally \par \par X-ray Ap/Lateral/Flexion/Extension of lumbar spine from today were viewed and interpreted.  L4-5 spondylolisthesis\par \par \[facility] At L2-L3 posterior disc protrusion is slightly more pronounced than on the prior\par study. There is mild thecal sac compression and mild to moderate foraminal\par stenosis. There is abutment of the left L3 nerve root origin.\par \par At L3-L4, a shallow right paracentral disc protrusion is new since the prior\par study. There is mild thecal sac compression and right foraminal stenosis.\par \par At L4-L5 moderate to severe thecal sac compression and moderate foraminal\par stenosis is unchanged.\par \par At L5-S1 there is moderate facet arthrosis. Small well-corticated ossific\par fragment along the inferior margin of the right L5 inferior facet indicative of\par old fracture.

## 2023-01-12 ENCOUNTER — RESULT REVIEW (OUTPATIENT)
Age: 58
End: 2023-01-12

## 2023-06-13 ENCOUNTER — NON-APPOINTMENT (OUTPATIENT)
Age: 58
End: 2023-06-13

## 2023-06-13 ENCOUNTER — APPOINTMENT (OUTPATIENT)
Dept: CARDIOLOGY | Facility: CLINIC | Age: 58
End: 2023-06-13
Payer: COMMERCIAL

## 2023-06-13 VITALS
BODY MASS INDEX: 32.28 KG/M2 | DIASTOLIC BLOOD PRESSURE: 78 MMHG | OXYGEN SATURATION: 99 % | WEIGHT: 200 LBS | SYSTOLIC BLOOD PRESSURE: 126 MMHG | HEART RATE: 65 BPM

## 2023-06-13 VITALS
HEART RATE: 65 BPM | DIASTOLIC BLOOD PRESSURE: 78 MMHG | RESPIRATION RATE: 14 BRPM | BODY MASS INDEX: 32.14 KG/M2 | OXYGEN SATURATION: 99 % | WEIGHT: 200 LBS | HEIGHT: 66 IN | SYSTOLIC BLOOD PRESSURE: 126 MMHG

## 2023-06-13 PROCEDURE — 93000 ELECTROCARDIOGRAM COMPLETE: CPT

## 2023-06-13 PROCEDURE — 99214 OFFICE O/P EST MOD 30 MIN: CPT | Mod: 25

## 2023-06-13 NOTE — PHYSICAL EXAM
[Normal S1, S2] : normal S1, S2 [Clear Lung Fields] : clear lung fields [Soft] : abdomen soft [Normal Bowel Sounds] : normal bowel sounds [No Edema] : no edema [Alert and Oriented] : alert and oriented [Obese] : obese [de-identified] : Extraocular muscles intact [de-identified] : No JVD [de-identified] : Irregular

## 2023-06-13 NOTE — HISTORY OF PRESENT ILLNESS
[FreeTextEntry1] : Maura Elias is a 58-year-old woman with a history of hypercholesterolemia, mild mitral valve regurgitation, acid reflux, palpitations, family history of heart disease who returns for cardiac examination after establishing care with me in March 2022 because of palpitations.\par \par She describes a recent increase in palpitations that she attributes to stress–recent death of a close family member and some increase in caffeine consumption.  She denies angina but describes occasional chest discomfort––precordial, sharp, nonradiating, occurs at sporadic times.

## 2023-06-13 NOTE — DISCUSSION/SUMMARY
[FreeTextEntry1] : \par Abnormal ECG: Mild and nonspecific T wave flattening --  ischemia evaluation recommended (see below).\par \par Chest discomfort: Symptoms are somewhat atypical for angina but she has a family history of heart disease and now frequent PVCs on resting ECG.  I recommend CT coronary angiography to assess for the presence of coronary artery disease.\par \par Palpitations: Frequent PVCs on today's ECG; recent stressors and caffeine may be contributing; ischemia evaluation planned.  I recommended 24-hour Holter monitor to assess burden of PVCs.  I offered a trial of beta-blockade but patient declined–wants to wait until above tests have been completed and to see if palpitations resolved spontaneously.\par \par Hypercholesterolemia: Tolerating atorvastatin.\par

## 2023-06-13 NOTE — CARDIOLOGY SUMMARY
[de-identified] : 6/13/2023.  Sinus  Rhythm with frequent PVCs [de-identified] : 3/17/22 – 3/22/22.  Sinus rhythm with average rate 77 bpm.  Brief atrial tachycardia (5 beats).  Rare premature atrial complexes.  Occasional premature ventricular complexes. [de-identified] : 3/17/2022.  Normal left ventricular size and systolic function with estimated ejection fraction 56%.  Mild diastolic dysfunction (stage I).  Normal right ventricular size and function.  Minimal tricuspid regurgitation.  Normal estimated pulmonary artery pressure.

## 2023-06-13 NOTE — REVIEW OF SYSTEMS
[Weight Loss (___ Lbs)] : [unfilled] ~Ulb weight loss [Palpitations] : palpitations [Negative] : Heme/Lymph [Chest Discomfort] : chest discomfort [Under Stress] : under stress

## 2023-06-15 ENCOUNTER — APPOINTMENT (OUTPATIENT)
Dept: CARDIOLOGY | Facility: CLINIC | Age: 58
End: 2023-06-15
Payer: COMMERCIAL

## 2023-06-21 ENCOUNTER — NON-APPOINTMENT (OUTPATIENT)
Age: 58
End: 2023-06-21

## 2023-06-21 PROCEDURE — 93224 XTRNL ECG REC UP TO 48 HRS: CPT

## 2023-07-05 ENCOUNTER — NON-APPOINTMENT (OUTPATIENT)
Age: 58
End: 2023-07-05

## 2023-07-06 ENCOUNTER — APPOINTMENT (OUTPATIENT)
Dept: CT IMAGING | Facility: CLINIC | Age: 58
End: 2023-07-06

## 2023-08-18 LAB
ALBUMIN SERPL ELPH-MCNC: 4.7 G/DL
ALP BLD-CCNC: 110 U/L
ALT SERPL-CCNC: 25 U/L
ANION GAP SERPL CALC-SCNC: 12 MMOL/L
AST SERPL-CCNC: 20 U/L
BILIRUB SERPL-MCNC: 0.6 MG/DL
BUN SERPL-MCNC: 15 MG/DL
CALCIUM SERPL-MCNC: 10.1 MG/DL
CHLORIDE SERPL-SCNC: 105 MMOL/L
CHOLEST SERPL-MCNC: 230 MG/DL
CO2 SERPL-SCNC: 27 MMOL/L
CREAT SERPL-MCNC: 0.91 MG/DL
EGFR: 74 ML/MIN/1.73M2
ESTIMATED AVERAGE GLUCOSE: 126 MG/DL
GLUCOSE SERPL-MCNC: 107 MG/DL
HBA1C MFR BLD HPLC: 6 %
HDLC SERPL-MCNC: 82 MG/DL
LDLC SERPL CALC-MCNC: 127 MG/DL
NONHDLC SERPL-MCNC: 148 MG/DL
POTASSIUM SERPL-SCNC: 5.8 MMOL/L
PROT SERPL-MCNC: 7.4 G/DL
SODIUM SERPL-SCNC: 144 MMOL/L
TRIGL SERPL-MCNC: 121 MG/DL
TSH SERPL-ACNC: 1.19 UIU/ML

## 2023-08-22 ENCOUNTER — APPOINTMENT (OUTPATIENT)
Dept: CARDIOLOGY | Facility: CLINIC | Age: 58
End: 2023-08-22
Payer: COMMERCIAL

## 2023-08-22 VITALS
WEIGHT: 200 LBS | BODY MASS INDEX: 32.28 KG/M2 | HEART RATE: 62 BPM | DIASTOLIC BLOOD PRESSURE: 80 MMHG | OXYGEN SATURATION: 99 % | SYSTOLIC BLOOD PRESSURE: 124 MMHG

## 2023-08-22 VITALS
RESPIRATION RATE: 14 BRPM | BODY MASS INDEX: 32.14 KG/M2 | WEIGHT: 200 LBS | HEART RATE: 65 BPM | HEIGHT: 66 IN | OXYGEN SATURATION: 99 % | SYSTOLIC BLOOD PRESSURE: 126 MMHG | DIASTOLIC BLOOD PRESSURE: 78 MMHG

## 2023-08-22 DIAGNOSIS — E87.5 HYPERKALEMIA: ICD-10-CM

## 2023-08-22 DIAGNOSIS — R07.89 OTHER CHEST PAIN: ICD-10-CM

## 2023-08-22 PROCEDURE — 99214 OFFICE O/P EST MOD 30 MIN: CPT | Mod: 25

## 2023-08-22 PROCEDURE — 93000 ELECTROCARDIOGRAM COMPLETE: CPT

## 2023-08-22 NOTE — PHYSICAL EXAM
[Obese] : obese [Normal S1, S2] : normal S1, S2 [Clear Lung Fields] : clear lung fields [No Edema] : no edema [Alert and Oriented] : alert and oriented [No Acute Distress] : no acute distress [Normal Gait] : normal gait [de-identified] : Regular

## 2023-08-22 NOTE — CARDIOLOGY SUMMARY
[de-identified] : 8/22/2023.  Normal sinus rhythm, 60 bpm. [de-identified] : 6/15/2023 (Max[place]a).  The underlying heart rhythm is sinus with rates ranging from 45 bpm to 144 bpm; average rate 70 bpm. There are frequent premature ventricular complexes (14,866 PVCs & 13.3% of all beats). No ventricular tachycardia. Rare premature atrial complexes. No atrial fibrillation, high degree AV block, or sinus pauses. -------- 3/17/22 - 3/22/22.  Sinus rhythm with average rate 77 bpm.  Brief atrial tachycardia (5 beats).  Rare premature atrial complexes.  Occasional premature ventricular complexes. [de-identified] : 3/17/2022.  Normal left ventricular size and systolic function with estimated ejection fraction 56%.  Mild diastolic dysfunction (stage I).  Normal right ventricular size and function.  Minimal tricuspid regurgitation.  Normal estimated pulmonary artery pressure. [de-identified] : 6/27/2023.  Coronary calcium score is 0.  Normal RCA, left main, and proximal LAD.  Remaining coronary segments nondiagnostic due to ectopic beats during examination, limiting assessment.

## 2023-08-22 NOTE — HISTORY OF PRESENT ILLNESS
[FreeTextEntry1] : Maura Elias is a 58-year-old woman with a history of hypercholesterolemia, mild mitral valve regurgitation, acid reflux, palpitations, family history of heart disease who returns for cardiac examination.  During our last encounter in June, she described increasing palpitations and ambulatory ECG monitor revealed frequent premature ventricular complexes; I prescribed metoprolol.    She feels significantly better with less frequent palpitations.  She has been exercising–bicycles to a local track and then walks 2 to 3 miles and bicycles home;  no angina or dyspnea.  She is trying to lose weight but has trouble with "too much snacking."

## 2023-08-22 NOTE — DISCUSSION/SUMMARY
[With Me] : with me [___ Month(s)] : in [unfilled] month(s) [FreeTextEntry1] :  Abnormal ECG: No significant coronary artery disease detected on recent CT coronary (imaging limitations noted).  Chest discomfort: Resolved; exercising daily with no angina.  Palpitations: Frequent PVCs -- Improved; frequency of palpitations have markedly decreased, perhaps as a result of low-dose beta-blockade as well as reduction  Hypercholesterolemia: Mild elevation; continue atorvastatin; Recheck in the spring 2024–may need to titrate Rx if not at goal.  Hyperkalemia: Mild; repeat metabolic panel today.

## 2023-08-22 NOTE — REVIEW OF SYSTEMS
[Weight Loss (___ Lbs)] : [unfilled] ~Ulb weight loss [Palpitations] : palpitations [Under Stress] : under stress [Negative] : Heme/Lymph [SOB] : no shortness of breath [Chest Discomfort] : no chest discomfort [FreeTextEntry5] : See HPI [de-identified] : Reduced stress

## 2023-08-23 LAB
ANION GAP SERPL CALC-SCNC: 15 MMOL/L
BUN SERPL-MCNC: 15 MG/DL
CALCIUM SERPL-MCNC: 9.9 MG/DL
CHLORIDE SERPL-SCNC: 105 MMOL/L
CO2 SERPL-SCNC: 24 MMOL/L
CREAT SERPL-MCNC: 0.9 MG/DL
EGFR: 75 ML/MIN/1.73M2
GLUCOSE SERPL-MCNC: 102 MG/DL
POTASSIUM SERPL-SCNC: 5.9 MMOL/L
SODIUM SERPL-SCNC: 144 MMOL/L

## 2023-08-23 RX ORDER — METOPROLOL SUCCINATE 25 MG/1
25 TABLET, EXTENDED RELEASE ORAL DAILY
Qty: 90 | Refills: 1 | Status: DISCONTINUED | COMMUNITY
Start: 2023-07-11 | End: 2023-08-23

## 2023-09-01 DIAGNOSIS — Z01.812 ENCOUNTER FOR PREPROCEDURAL LABORATORY EXAMINATION: ICD-10-CM

## 2023-09-01 DIAGNOSIS — Z20.822 ENCOUNTER FOR PREPROCEDURAL LABORATORY EXAMINATION: ICD-10-CM

## 2023-09-01 DIAGNOSIS — I34.0 NONRHEUMATIC MITRAL (VALVE) INSUFFICIENCY: ICD-10-CM

## 2023-09-01 LAB
ANION GAP SERPL CALC-SCNC: 16 MMOL/L
BUN SERPL-MCNC: 15 MG/DL
CALCIUM SERPL-MCNC: 9.7 MG/DL
CHLORIDE SERPL-SCNC: 103 MMOL/L
CO2 SERPL-SCNC: 23 MMOL/L
CREAT SERPL-MCNC: 0.92 MG/DL
EGFR: 73 ML/MIN/1.73M2
GLUCOSE SERPL-MCNC: 101 MG/DL
POTASSIUM SERPL-SCNC: 5 MMOL/L
SODIUM SERPL-SCNC: 142 MMOL/L

## 2023-11-29 NOTE — ED ADULT TRIAGE NOTE - NS ED TRIAGE AVPU SCALE
A/P: 37y  PPD # 1 S/P  doing well    Current Issues: None  PAST MEDICAL & SURGICAL HISTORY: Denies Alert-The patient is alert, awake and responds to voice. The patient is oriented to time, place, and person. The triage nurse is able to obtain subjective information.

## 2023-12-21 ENCOUNTER — APPOINTMENT (OUTPATIENT)
Dept: ORTHOPEDIC SURGERY | Facility: CLINIC | Age: 58
End: 2023-12-21

## 2024-01-05 NOTE — OBJECTIVE
[Clean, Dry, Intact] : Clean, Dry, Intact [Good Support] : Good support [Healing well] : healing well [No Masses or Tenderness] : no masses or tenderness [FreeTextEntry1] : LLQ slight tenderness on palpation.  Likely due to scar tissue/manipulation during surgery. abd soft. [FreeTextEntry3] : no evidence of mesh exposure. Attending and PA/NP shared services statement (NON-critical care):

## 2024-01-09 ENCOUNTER — NON-APPOINTMENT (OUTPATIENT)
Age: 59
End: 2024-01-09

## 2024-01-09 ENCOUNTER — APPOINTMENT (OUTPATIENT)
Dept: CARDIOLOGY | Facility: CLINIC | Age: 59
End: 2024-01-09
Payer: COMMERCIAL

## 2024-01-09 VITALS
WEIGHT: 206 LBS | OXYGEN SATURATION: 100 % | BODY MASS INDEX: 33.25 KG/M2 | SYSTOLIC BLOOD PRESSURE: 120 MMHG | HEART RATE: 55 BPM | DIASTOLIC BLOOD PRESSURE: 80 MMHG

## 2024-01-09 VITALS
OXYGEN SATURATION: 100 % | BODY MASS INDEX: 33.11 KG/M2 | WEIGHT: 206 LBS | RESPIRATION RATE: 14 BRPM | DIASTOLIC BLOOD PRESSURE: 80 MMHG | HEART RATE: 55 BPM | HEIGHT: 66 IN | SYSTOLIC BLOOD PRESSURE: 120 MMHG

## 2024-01-09 DIAGNOSIS — R00.2 PALPITATIONS: ICD-10-CM

## 2024-01-09 DIAGNOSIS — R94.31 ABNORMAL ELECTROCARDIOGRAM [ECG] [EKG]: ICD-10-CM

## 2024-01-09 PROCEDURE — 93000 ELECTROCARDIOGRAM COMPLETE: CPT

## 2024-01-09 PROCEDURE — 99214 OFFICE O/P EST MOD 30 MIN: CPT | Mod: 25

## 2024-01-13 ENCOUNTER — APPOINTMENT (OUTPATIENT)
Dept: ORTHOPEDIC SURGERY | Facility: CLINIC | Age: 59
End: 2024-01-13
Payer: COMMERCIAL

## 2024-01-13 ENCOUNTER — RESULT CHARGE (OUTPATIENT)
Age: 59
End: 2024-01-13

## 2024-01-13 VITALS — BODY MASS INDEX: 33.11 KG/M2 | WEIGHT: 206 LBS | HEIGHT: 66 IN

## 2024-01-13 DIAGNOSIS — S76.319A STRAIN OF MUSCLE, FASCIA AND TENDON OF THE POSTERIOR MUSCLE GROUP AT THIGH LEVEL, UNSPECIFIED THIGH, INITIAL ENCOUNTER: ICD-10-CM

## 2024-01-13 DIAGNOSIS — E78.00 PURE HYPERCHOLESTEROLEMIA, UNSPECIFIED: ICD-10-CM

## 2024-01-13 PROCEDURE — 99203 OFFICE O/P NEW LOW 30 MIN: CPT

## 2024-01-13 PROCEDURE — 73522 X-RAY EXAM HIPS BI 3-4 VIEWS: CPT

## 2024-01-13 NOTE — IMAGING
[de-identified] : PE R thigh/knee: +swelling posterior knee, skin intact, no deformity, no ecchymosis, no JLT, neg sarita, ligs stable, able to SLR, FROM hip and knee, NVI [Right] : right femur [There are no fractures, subluxations or dislocations. No significant abnormalities are seen] : There are no fractures, subluxations or dislocations. No significant abnormalities are seen

## 2024-01-13 NOTE — HISTORY OF PRESENT ILLNESS
[7] : 7 [5] : 5 [Burning] : burning [Tightness] : tightness [Sitting] : sitting [Walking] : walking [de-identified] : 59 y/o F with R thigh and knee pain after doing a cartwheel about 3 weeks ago. denies buckling or locking. denies numbness/tingling. She has not tried any interventions. no pain with stairs. denies DM. [] : no [FreeTextEntry3] : 3 weeks ago  [FreeTextEntry5] : Pt states she pulled her hamstring and felt a pop, after doing a cartwheel, also states behind her right knee hurts,

## 2024-01-21 LAB
ALBUMIN SERPL ELPH-MCNC: 4.8 G/DL
ALP BLD-CCNC: 106 U/L
ALT SERPL-CCNC: 22 U/L
ANION GAP SERPL CALC-SCNC: 12 MMOL/L
AST SERPL-CCNC: 23 U/L
BILIRUB SERPL-MCNC: 0.5 MG/DL
BUN SERPL-MCNC: 15 MG/DL
CALCIUM SERPL-MCNC: 9.7 MG/DL
CHLORIDE SERPL-SCNC: 101 MMOL/L
CHOLEST SERPL-MCNC: 206 MG/DL
CO2 SERPL-SCNC: 25 MMOL/L
CREAT SERPL-MCNC: 0.87 MG/DL
EGFR: 78 ML/MIN/1.73M2
GLUCOSE SERPL-MCNC: 96 MG/DL
HDLC SERPL-MCNC: 75 MG/DL
LDLC SERPL CALC-MCNC: 114 MG/DL
NONHDLC SERPL-MCNC: 132 MG/DL
POTASSIUM SERPL-SCNC: 4.7 MMOL/L
PROT SERPL-MCNC: 7.5 G/DL
SODIUM SERPL-SCNC: 138 MMOL/L
TRIGL SERPL-MCNC: 99 MG/DL

## 2024-01-24 ENCOUNTER — APPOINTMENT (OUTPATIENT)
Dept: ORTHOPEDIC SURGERY | Facility: CLINIC | Age: 59
End: 2024-01-24
Payer: COMMERCIAL

## 2024-01-24 VITALS — BODY MASS INDEX: 33.11 KG/M2 | HEIGHT: 66 IN | WEIGHT: 206 LBS

## 2024-01-24 DIAGNOSIS — S76.311A STRAIN OF MUSCLE, FASCIA AND TENDON OF THE POSTERIOR MUSCLE GROUP AT THIGH LEVEL, RIGHT THIGH, INITIAL ENCOUNTER: ICD-10-CM

## 2024-01-24 PROCEDURE — ZZZZZ: CPT

## 2024-02-20 ENCOUNTER — APPOINTMENT (OUTPATIENT)
Dept: CARDIOLOGY | Facility: CLINIC | Age: 59
End: 2024-02-20

## 2024-03-13 ENCOUNTER — APPOINTMENT (OUTPATIENT)
Dept: ORTHOPEDIC SURGERY | Facility: CLINIC | Age: 59
End: 2024-03-13
Payer: COMMERCIAL

## 2024-03-13 DIAGNOSIS — M17.11 UNILATERAL PRIMARY OSTEOARTHRITIS, RIGHT KNEE: ICD-10-CM

## 2024-03-13 PROCEDURE — ZZZZZ: CPT

## 2024-03-14 NOTE — DATA REVIEWED
[MRI] : MRI [Right] : of the right [Knee] : knee [Report was reviewed and noted in the chart] : The report was reviewed and noted in the chart [I independently reviewed and interpreted images and report] : I independently reviewed and interpreted images and report [I reviewed the films/CD] : I reviewed the films/CD [FreeTextEntry1] : 01.19.24 (ZP) Right knee:  1. Oblique tear in the peripheral posterior horn of the medial meniscus. 2. 6 mm near full-thickness cartilage defect along the weightbearing aspect of the medial femoral condyle. 3. Mild edema like signal about proximal and insertional fibers of the patellar tendon suggestive of peritendinitis but clinical correlation recommended.  01.19.24 ( ZP) RLE Abnormal edema like signal about the semimembranosus origin and proximal tendon consistent with grade I strain. No focal fluid collection demonstrated. Other incidental findings noted.

## 2024-03-14 NOTE — DISCUSSION/SUMMARY
[de-identified] : 58f with right knee mmt and djd.  r/b/a of surgical intervention and conservative management discussed. pt given to opportunity to ask all questions and all questions were answered. Pt would like to proceed with surgery as discussed. Will be considering timing of surgical intervention as her son's wedding is in the near future.  Discussed right knee arthroscopy, partial medial meniscectomy.  Recommend continuing with conservative treatment at this time including nsaids, physical therapy and csi / visco injections.   discussed availability of visco injections and pt would like to proceed. will request auth for a series of injections.   The patient has two pathologic conditions at this point. There is a meniscus tear which is causing symptomology and there is also underlying osteoarthritis. The patient was counseled that surgical intervention to address the torn meniscus will not change the symptomology attributable to the arthritis. The patient was advised that the pain attributable to the meniscus tear should be resolved arthroscopically. However, the patient should expect to have continued aches and pains related to the arthritis, in the form of, but not limited to pain, weather related changes, dull ache, crepitation, limitation of motion and strength and the need for further surgeries. The patient understands that the arthroscopic procedure addresses the meniscus only and that after surgery, the knee will not be 100% but it should be improved with respect to the symptomology attributed to the torn meniscus. Patient also is aware that further treatment after arthroscopy may be necessary in the form of oral medications, cortisone and/or viscosupplementation injections, and further surgeries including knee replacement.   The patient was advised of the diagnosis. The natural history of the pathology was explained in full to the patient in layman's terms. All questions were answered. The risks and benefits of surgical and non-surgical treatment alternatives were explained in full to the patient.  The patient demonstrated a full understanding of the surgical and non-surgical options. The risks of surgery were outlined in full to the patient including but not limited to bleeding, scarring, infection, sepsis, neurologic injury, vascular injury, failure to resolve symptoms, symptom recurrence, the need for further surgery, non-healing, wound breakdown, deep vein thrombosis, pulmonary embolism, spontaneous osteonecrosis, anesthesia complications and even death. The patient understood all the risks and accepted them and understood that other complications could occur that are not mentioned above. The intraoperative plan, post-operative plan, post-operative expectations and limitations were explained in full. Expectations from non-surgical treatment were explained in full as well. The patient demonstrated a complete understanding of the treatment alternatives and requested the above-mentioned procedure. This will be scheduled accordingly   Entered by Lesley Trejo acting as scribe. Dr. Tam- The documentation recorded by the scribe accurately reflects the service I personally performed and the decisions made by me.

## 2024-03-14 NOTE — HISTORY OF PRESENT ILLNESS
[de-identified] : 3/13/24: here to follow up right hamstring and knee. CSI 1/24/24 relief lasted about six weeks. Pain is now back at initial levels.  01/24/2024 : MIGUE MADISON is a 58 year female presenting today for right hamstring and knee pain. Patient was doing a cartwheel on 12/24/23 and when she bowed forward she felt a pop in her hamstring. started experiencing right knee pain a few days later. Worse with stairs, prolonged walking, pivoting. Better with rest, ice. Prior treatment: O&C UC HOSSEIN camacho, MRI @ [facility]. Occupation: special  PMH: High cholesterol

## 2024-03-14 NOTE — HISTORY OF PRESENT ILLNESS
[de-identified] : 01/24/2024 : MIGUE MADISON is a 58 year female presenting today for right hamstring and knee pain. Patient was doing a cartwheel on 12/24/23 and when she bowed forward she felt a pop in her hamstring. started experiencing right knee pain a few days later. Worse with stairs, prolonged walking, pivoting. Better with rest, ice. Prior treatment: O&C LONI camacho, MRI @ [facility]. Occupation: special  PMH: High cholesterol

## 2024-03-14 NOTE — DISCUSSION/SUMMARY
[de-identified] : 58f with right knee djd and mmt. 1) csi right knee today - tolerated well  2) start physical therapy 3) cryotherapy, rest and activity modification  4) rtc 6 weeks  Entered by Lesley Trejo acting as scribe. Dr. Tam- The documentation recorded by the scribe accurately reflects the service I personally performed and the decisions made by me.

## 2024-03-14 NOTE — PHYSICAL EXAM
[Right] : right knee [NL (0)] : extension 0 degrees [5___] : hamstring 5[unfilled]/5 [Positive] : positive Devon [] : no extensor lag [TWNoteComboBox7] : flexion 120 degrees

## 2024-04-02 ENCOUNTER — NON-APPOINTMENT (OUTPATIENT)
Age: 59
End: 2024-04-02

## 2024-04-02 ENCOUNTER — APPOINTMENT (OUTPATIENT)
Dept: CARDIOLOGY | Facility: CLINIC | Age: 59
End: 2024-04-02
Payer: COMMERCIAL

## 2024-04-02 VITALS — DIASTOLIC BLOOD PRESSURE: 74 MMHG | SYSTOLIC BLOOD PRESSURE: 110 MMHG

## 2024-04-02 VITALS
DIASTOLIC BLOOD PRESSURE: 70 MMHG | OXYGEN SATURATION: 99 % | HEART RATE: 60 BPM | WEIGHT: 194 LBS | SYSTOLIC BLOOD PRESSURE: 102 MMHG | BODY MASS INDEX: 31.31 KG/M2

## 2024-04-02 DIAGNOSIS — E78.00 PURE HYPERCHOLESTEROLEMIA, UNSPECIFIED: ICD-10-CM

## 2024-04-02 DIAGNOSIS — Z01.810 ENCOUNTER FOR PREPROCEDURAL CARDIOVASCULAR EXAMINATION: ICD-10-CM

## 2024-04-02 DIAGNOSIS — I49.3 VENTRICULAR PREMATURE DEPOLARIZATION: ICD-10-CM

## 2024-04-02 PROCEDURE — 93000 ELECTROCARDIOGRAM COMPLETE: CPT | Mod: NC

## 2024-04-02 PROCEDURE — 99214 OFFICE O/P EST MOD 30 MIN: CPT | Mod: 25

## 2024-04-02 NOTE — PHYSICAL EXAM
[No Murmur] : no murmur [Normal S1, S2] : normal S1, S2 [Clear Lung Fields] : clear lung fields [No Edema] : no edema [Normal Gait] : normal gait [Alert and Oriented] : alert and oriented [de-identified] : Appears her stated age and well [de-identified] : Pupils round

## 2024-04-02 NOTE — REVIEW OF SYSTEMS
[Palpitations] : palpitations [Under Stress] : under stress [Negative] : Heme/Lymph [Weight Loss (___ Lbs)] : [unfilled] ~Ulb weight loss [Dyspnea on exertion] : not dyspnea during exertion [Joint Pain] : joint pain [Chest Discomfort] : no chest discomfort

## 2024-04-02 NOTE — HISTORY OF PRESENT ILLNESS
[FreeTextEntry1] : Maura Elias is a 59-year-old woman with a history of hypercholesterolemia, mild mitral valve regurgitation, acid reflux, palpitations, premature ventricular complexes, hyperkalemia (attributed to beta-blocker use), and family history of heart disease who returns for cardiac examination prior to arthroscopic knee surgery. She continues to feel well from a cardiac standpoint.  She experiences palpitations--decrease frequency in recent months and predominantly at night.  She has a good baseline functional status and has not been experiencing angina, dyspnea, or syncope.  She has had prior surgical procedures--hysterectomy several years ago; no history of problems related to anesthesia or bleeding.

## 2024-04-02 NOTE — DISCUSSION/SUMMARY
[With Me] : with me [___ Month(s)] : in [unfilled] month(s) [FreeTextEntry1] :  Preoperative cardiac examination: Stable from cardiac standpoint for upcoming orthopedic surgical procedure; Prior noninvasive cardiac testing described above.  She has a good baseline functional status and normal resting twelve-lead ECG today.  Premature ventricular complexes: Frequency of ventricular ectopy has decreased; she did not tolerate metoprolol; doing well without Rx at this time.  Hyperlipidemia: Tolerating atorvastatin. Lipid panel in January revealed LDL of 114.

## 2024-04-02 NOTE — CARDIOLOGY SUMMARY
[de-identified] : 4/2/2024. Normal sinus rhythm [de-identified] : 6/15/2023 ([place]).  The underlying heart rhythm is sinus with rates ranging from 45 bpm to 144 bpm; average rate 70 bpm. There are frequent premature ventricular complexes (14,866 PVCs & 13.3% of all beats). No ventricular tachycardia. Rare premature atrial complexes. No atrial fibrillation, high degree AV block, or sinus pauses. ----- 9/21/23 - 9/28/23: Sinus rhythm with rare premature atrial complexes and occasional premature ventricular complexes.  No ventricular tachycardia.  When compared to a prior test, the frequency of PVCs has decreased. -------- 3/17/22 - 3/22/22.  Sinus rhythm with average rate 77 bpm.  Brief atrial tachycardia (5 beats).  Rare premature atrial complexes.  Occasional premature ventricular complexes. [de-identified] : 3/17/2022.  Normal left ventricular size and systolic function with estimated ejection fraction 56%.  Mild diastolic dysfunction (stage I).  Normal right ventricular size and function.  Minimal tricuspid regurgitation.  Normal estimated pulmonary artery pressure. [de-identified] : 6/27/2023.  Coronary calcium score is 0.  Normal RCA, left main, and proximal LAD.  Remaining coronary segments nondiagnostic due to ectopic beats during examination, limiting assessment.

## 2024-04-03 ENCOUNTER — OUTPATIENT (OUTPATIENT)
Dept: OUTPATIENT SERVICES | Facility: HOSPITAL | Age: 59
LOS: 1 days | End: 2024-04-03
Payer: COMMERCIAL

## 2024-04-03 VITALS
HEART RATE: 58 BPM | WEIGHT: 195.99 LBS | RESPIRATION RATE: 18 BRPM | SYSTOLIC BLOOD PRESSURE: 135 MMHG | HEIGHT: 67 IN | OXYGEN SATURATION: 98 % | TEMPERATURE: 98 F | DIASTOLIC BLOOD PRESSURE: 73 MMHG

## 2024-04-03 DIAGNOSIS — Z96.0 PRESENCE OF UROGENITAL IMPLANTS: Chronic | ICD-10-CM

## 2024-04-03 DIAGNOSIS — Z01.818 ENCOUNTER FOR OTHER PREPROCEDURAL EXAMINATION: ICD-10-CM

## 2024-04-03 DIAGNOSIS — S83.241A OTHER TEAR OF MEDIAL MENISCUS, CURRENT INJURY, RIGHT KNEE, INITIAL ENCOUNTER: ICD-10-CM
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
## 2024-04-03 DIAGNOSIS — Z90.711 ACQUIRED ABSENCE OF UTERUS WITH REMAINING CERVICAL STUMP: Chronic | ICD-10-CM

## 2024-04-03 DIAGNOSIS — M17.11 UNILATERAL PRIMARY OSTEOARTHRITIS, RIGHT KNEE: ICD-10-CM

## 2024-04-03 DIAGNOSIS — Z98.51 TUBAL LIGATION STATUS: Chronic | ICD-10-CM

## 2024-04-03 LAB
ALBUMIN SERPL ELPH-MCNC: 3.9 G/DL — SIGNIFICANT CHANGE UP (ref 3.3–5)
ALP SERPL-CCNC: 85 U/L — SIGNIFICANT CHANGE UP (ref 40–120)
ALT FLD-CCNC: 29 U/L — SIGNIFICANT CHANGE UP (ref 12–78)
ANION GAP SERPL CALC-SCNC: 5 MMOL/L — SIGNIFICANT CHANGE UP (ref 5–17)
AST SERPL-CCNC: 19 U/L — SIGNIFICANT CHANGE UP (ref 15–37)
BILIRUB SERPL-MCNC: 0.4 MG/DL — SIGNIFICANT CHANGE UP (ref 0.2–1.2)
BUN SERPL-MCNC: 11 MG/DL — SIGNIFICANT CHANGE UP (ref 7–23)
CALCIUM SERPL-MCNC: 9 MG/DL — SIGNIFICANT CHANGE UP (ref 8.5–10.1)
CHLORIDE SERPL-SCNC: 112 MMOL/L — HIGH (ref 96–108)
CO2 SERPL-SCNC: 28 MMOL/L — SIGNIFICANT CHANGE UP (ref 22–31)
CREAT SERPL-MCNC: 0.92 MG/DL — SIGNIFICANT CHANGE UP (ref 0.5–1.3)
EGFR: 72 ML/MIN/1.73M2 — SIGNIFICANT CHANGE UP
GLUCOSE SERPL-MCNC: 118 MG/DL — HIGH (ref 70–99)
HCT VFR BLD CALC: 40.9 % — SIGNIFICANT CHANGE UP (ref 34.5–45)
HGB BLD-MCNC: 13 G/DL — SIGNIFICANT CHANGE UP (ref 11.5–15.5)
MCHC RBC-ENTMCNC: 28.6 PG — SIGNIFICANT CHANGE UP (ref 27–34)
MCHC RBC-ENTMCNC: 31.8 GM/DL — LOW (ref 32–36)
MCV RBC AUTO: 89.9 FL — SIGNIFICANT CHANGE UP (ref 80–100)
NRBC # BLD: 0 /100 WBCS — SIGNIFICANT CHANGE UP (ref 0–0)
PLATELET # BLD AUTO: 334 K/UL — SIGNIFICANT CHANGE UP (ref 150–400)
POTASSIUM SERPL-MCNC: 5 MMOL/L — SIGNIFICANT CHANGE UP (ref 3.5–5.3)
POTASSIUM SERPL-SCNC: 5 MMOL/L — SIGNIFICANT CHANGE UP (ref 3.5–5.3)
PROT SERPL-MCNC: 7.6 G/DL — SIGNIFICANT CHANGE UP (ref 6–8.3)
RBC # BLD: 4.55 M/UL — SIGNIFICANT CHANGE UP (ref 3.8–5.2)
RBC # FLD: 12.6 % — SIGNIFICANT CHANGE UP (ref 10.3–14.5)
SODIUM SERPL-SCNC: 145 MMOL/L — SIGNIFICANT CHANGE UP (ref 135–145)
WBC # BLD: 6.49 K/UL — SIGNIFICANT CHANGE UP (ref 3.8–10.5)
WBC # FLD AUTO: 6.49 K/UL — SIGNIFICANT CHANGE UP (ref 3.8–10.5)

## 2024-04-03 PROCEDURE — G0463: CPT

## 2024-04-03 PROCEDURE — 80053 COMPREHEN METABOLIC PANEL: CPT

## 2024-04-03 PROCEDURE — 85027 COMPLETE CBC AUTOMATED: CPT

## 2024-04-03 PROCEDURE — 36415 COLL VENOUS BLD VENIPUNCTURE: CPT

## 2024-04-03 RX ORDER — IBUPROFEN 200 MG
3 TABLET ORAL
Qty: 0 | Refills: 0 | DISCHARGE

## 2024-04-03 RX ORDER — OMEPRAZOLE 10 MG/1
0 CAPSULE, DELAYED RELEASE ORAL
Qty: 0 | Refills: 0 | DISCHARGE

## 2024-04-03 RX ORDER — ATORVASTATIN CALCIUM 80 MG/1
0 TABLET, FILM COATED ORAL
Qty: 0 | Refills: 0 | DISCHARGE

## 2024-04-03 RX ORDER — PREDNISOLONE 5 MG
50 TABLET ORAL
Qty: 0 | Refills: 0 | DISCHARGE

## 2024-04-03 NOTE — H&P PST ADULT - NSICDXFAMILYHX_GEN_ALL_CORE_FT
FAMILY HISTORY:  Father  Still living? No  FH: lung cancer, Age at diagnosis: Age Unknown    Mother  Still living? Yes, Estimated age: Age Unknown  FH: heart attack, Age at diagnosis: Age Unknown

## 2024-04-03 NOTE — H&P PST ADULT - PROBLEM SELECTOR PLAN 1
scheduled for right knee arthroscopy with partial medial meniscectomy  pre-op instructions provided and understood  medical, cardiology clearance

## 2024-04-03 NOTE — H&P PST ADULT - HISTORY OF PRESENT ILLNESS
58 y/o female complains right knee pain. Patient was doing cart wheel on 12/24 and she felt a pop in  her hamstring. Started right knee pain a few days later. She reports  worst pain 8/10 with stairs, prolonged walking. Failed conservative management. Patient is scheduled for right knee Arthroscopy with partial medial meniscectomy on 04/19/2024.

## 2024-04-03 NOTE — H&P PST ADULT - NSICDXPASTMEDICALHX_GEN_ALL_CORE_FT
PAST MEDICAL HISTORY:  Anxiety     Chronic sinusitis for 20 years  DNS  S/p urgent care visit today before pre op/ upcoming GYn surgery    H/O cystoscopy     HLD (hyperlipidemia)     Tear of medial meniscus of right knee     Uterus prolapse
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
## 2024-04-03 NOTE — H&P PST ADULT - ATTENDING COMMENTS
Patient seen and examined. r/b/a of surgical intervention discussed in detail with the patient. patient given the opportunity to ask all questions, all questions were answered and under no duress patient signed informed consent and proceed with surgery as planned

## 2024-04-18 ENCOUNTER — TRANSCRIPTION ENCOUNTER (OUTPATIENT)
Age: 59
End: 2024-04-18

## 2024-04-19 ENCOUNTER — TRANSCRIPTION ENCOUNTER (OUTPATIENT)
Age: 59
End: 2024-04-19

## 2024-04-19 ENCOUNTER — APPOINTMENT (OUTPATIENT)
Dept: ORTHOPEDIC SURGERY | Facility: HOSPITAL | Age: 59
End: 2024-04-19
Payer: COMMERCIAL

## 2024-04-19 ENCOUNTER — OUTPATIENT (OUTPATIENT)
Dept: OUTPATIENT SERVICES | Facility: HOSPITAL | Age: 59
LOS: 1 days | End: 2024-04-19
Payer: COMMERCIAL

## 2024-04-19 VITALS
DIASTOLIC BLOOD PRESSURE: 66 MMHG | HEART RATE: 77 BPM | TEMPERATURE: 98 F | OXYGEN SATURATION: 100 % | SYSTOLIC BLOOD PRESSURE: 125 MMHG | WEIGHT: 191.8 LBS | RESPIRATION RATE: 15 BRPM | HEIGHT: 66 IN

## 2024-04-19 VITALS
HEART RATE: 66 BPM | OXYGEN SATURATION: 99 % | RESPIRATION RATE: 12 BRPM | DIASTOLIC BLOOD PRESSURE: 78 MMHG | SYSTOLIC BLOOD PRESSURE: 123 MMHG

## 2024-04-19 DIAGNOSIS — Z96.0 PRESENCE OF UROGENITAL IMPLANTS: Chronic | ICD-10-CM

## 2024-04-19 DIAGNOSIS — M17.11 UNILATERAL PRIMARY OSTEOARTHRITIS, RIGHT KNEE: ICD-10-CM

## 2024-04-19 DIAGNOSIS — Z90.711 ACQUIRED ABSENCE OF UTERUS WITH REMAINING CERVICAL STUMP: Chronic | ICD-10-CM

## 2024-04-19 DIAGNOSIS — Z98.51 TUBAL LIGATION STATUS: Chronic | ICD-10-CM

## 2024-04-19 DIAGNOSIS — S83.241A OTHER TEAR OF MEDIAL MENISCUS, CURRENT INJURY, RIGHT KNEE, INITIAL ENCOUNTER: ICD-10-CM

## 2024-04-19 DIAGNOSIS — Z01.818 ENCOUNTER FOR OTHER PREPROCEDURAL EXAMINATION: ICD-10-CM
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
## 2024-04-19 PROCEDURE — 29880 ARTHRS KNE SRG MNISECTMY M&L: CPT | Mod: RT

## 2024-04-19 PROCEDURE — 97161 PT EVAL LOW COMPLEX 20 MIN: CPT

## 2024-04-19 RX ORDER — HYDROMORPHONE HYDROCHLORIDE 2 MG/ML
1 INJECTION INTRAMUSCULAR; INTRAVENOUS; SUBCUTANEOUS
Refills: 0 | Status: DISCONTINUED | OUTPATIENT
Start: 2024-04-19 | End: 2024-04-19

## 2024-04-19 RX ORDER — ACETAMINOPHEN 500 MG
2 TABLET ORAL
Refills: 0 | DISCHARGE

## 2024-04-19 RX ORDER — OMEPRAZOLE 10 MG/1
1 CAPSULE, DELAYED RELEASE ORAL
Refills: 0 | DISCHARGE

## 2024-04-19 RX ORDER — TRAMADOL HYDROCHLORIDE 50 MG/1
50 TABLET, COATED ORAL
Qty: 10 | Refills: 0 | Status: ACTIVE | COMMUNITY
Start: 2024-04-19 | End: 1900-01-01

## 2024-04-19 RX ORDER — ATORVASTATIN CALCIUM 80 MG/1
1 TABLET, FILM COATED ORAL
Refills: 0 | DISCHARGE

## 2024-04-19 RX ORDER — ASPIRIN 325 MG/1
325 TABLET, FILM COATED ORAL DAILY
Qty: 14 | Refills: 0 | Status: ACTIVE | COMMUNITY
Start: 2024-04-19 | End: 1900-01-01

## 2024-04-19 RX ORDER — HYDROMORPHONE HYDROCHLORIDE 2 MG/ML
0.5 INJECTION INTRAMUSCULAR; INTRAVENOUS; SUBCUTANEOUS
Refills: 0 | Status: DISCONTINUED | OUTPATIENT
Start: 2024-04-19 | End: 2024-04-19

## 2024-04-19 RX ORDER — APREPITANT 80 MG/1
40 CAPSULE ORAL ONCE
Refills: 0 | Status: COMPLETED | OUTPATIENT
Start: 2024-04-19 | End: 2024-04-19

## 2024-04-19 RX ORDER — HYDROMORPHONE HYDROCHLORIDE 2 MG/ML
2 INJECTION INTRAMUSCULAR; INTRAVENOUS; SUBCUTANEOUS
Refills: 0 | Status: DISCONTINUED | OUTPATIENT
Start: 2024-04-19 | End: 2024-04-19

## 2024-04-19 RX ORDER — SODIUM CHLORIDE 9 MG/ML
1000 INJECTION, SOLUTION INTRAVENOUS
Refills: 0 | Status: DISCONTINUED | OUTPATIENT
Start: 2024-04-19 | End: 2024-04-19

## 2024-04-19 RX ORDER — ONDANSETRON 8 MG/1
4 TABLET, FILM COATED ORAL ONCE
Refills: 0 | Status: DISCONTINUED | OUTPATIENT
Start: 2024-04-19 | End: 2024-04-19

## 2024-04-19 RX ORDER — CHLORHEXIDINE GLUCONATE 213 G/1000ML
1 SOLUTION TOPICAL ONCE
Refills: 0 | Status: COMPLETED | OUTPATIENT
Start: 2024-04-19 | End: 2024-04-19

## 2024-04-19 RX ORDER — CEFAZOLIN SODIUM 1 G
2000 VIAL (EA) INJECTION ONCE
Refills: 0 | Status: COMPLETED | OUTPATIENT
Start: 2024-04-19 | End: 2024-04-19

## 2024-04-19 RX ADMIN — SODIUM CHLORIDE 75 MILLILITER(S): 9 INJECTION, SOLUTION INTRAVENOUS at 13:24

## 2024-04-19 RX ADMIN — CHLORHEXIDINE GLUCONATE 1 APPLICATION(S): 213 SOLUTION TOPICAL at 09:21

## 2024-04-19 RX ADMIN — APREPITANT 40 MILLIGRAM(S): 80 CAPSULE ORAL at 09:21

## 2024-04-19 NOTE — BRIEF OPERATIVE NOTE - NSICDXBRIEFPROCEDURE_GEN_ALL_CORE_FT
PROCEDURES:  Arthroscopy of right knee with partial surgical removal of medial meniscus 19-Apr-2024 13:16:00  Dom Jackson

## 2024-04-19 NOTE — PHYSICAL THERAPY INITIAL EVALUATION ADULT - PERTINENT HX OF CURRENT PROBLEM, REHAB EVAL
60 y/o female complains right knee pain. Patient was doing cart wheel on 12/24 and she felt a pop in  her hamstring. Started right knee pain a few days later. She reports  worst pain 8/10 with stairs, prolonged walking. Failed conservative management. Patient is scheduled for right knee Arthroscopy with partial medial meniscectomy on 04/19/2024.

## 2024-04-19 NOTE — PHYSICAL THERAPY INITIAL EVALUATION ADULT - PLANNED THERAPY INTERVENTIONS, PT EVAL
Pt received in PACU,sitting OOB chair. Pt seen for transfer, ambulation training WBAT right LE with cane. Pt verbally educated on stair negotiation and given written instructions. Pt given cane for home use. Pt is cleared from PT.

## 2024-04-19 NOTE — ASU PREOP CHECKLIST - TYPE OF SOLUTION
LR Patient does not appear to be in COPD exacerbation.  Will hold duonebs in light of possible anticholinergic toxicity.  Albuterol Q2 PRN  Sputum culture due to chronic cough.  Oxygen PRN to maintain O2 saturation above 92%

## 2024-04-19 NOTE — ASU DISCHARGE PLAN (ADULT/PEDIATRIC) - CARE PROVIDER_API CALL
Edson Tam  Orthopaedic Surgery  [address], [address]  [place] [address]  Phone: [phone]  Fax: [phone]  Follow Up Time:

## 2024-04-19 NOTE — ASU DISCHARGE PLAN (ADULT/PEDIATRIC) - NS MD DC FALL RISK RISK
For information on Fall & Injury Prevention, visit: [email or web address] OR  [email or web address] OR  https://www.cdc.gov/steadi/patient.html

## 2024-04-23 ENCOUNTER — RESULT REVIEW (OUTPATIENT)
Age: 59
End: 2024-04-23

## 2024-05-01 ENCOUNTER — APPOINTMENT (OUTPATIENT)
Dept: ORTHOPEDIC SURGERY | Facility: CLINIC | Age: 59
End: 2024-05-01
Payer: COMMERCIAL

## 2024-05-01 VITALS — BODY MASS INDEX: 31.18 KG/M2 | WEIGHT: 194 LBS | HEIGHT: 66 IN

## 2024-05-01 PROBLEM — Z98.890 OTHER SPECIFIED POSTPROCEDURAL STATES: Chronic | Status: ACTIVE | Noted: 2024-04-03

## 2024-05-01 PROBLEM — S83.241A OTHER TEAR OF MEDIAL MENISCUS, CURRENT INJURY, RIGHT KNEE, INITIAL ENCOUNTER: Chronic | Status: ACTIVE | Noted: 2024-04-03

## 2024-05-01 PROCEDURE — 99024 POSTOP FOLLOW-UP VISIT: CPT

## 2024-05-01 NOTE — DISCUSSION/SUMMARY
[de-identified] : 59f s/p right knee pmm 04.19.24 1) reviewed post-op precautions and procedures. 2) start physical therapy 3) cryotherapy, rest and activity modification 4) rtc 4 weeks  Entered by Lesley Trejo acting as scribe. Dr. Tam- The documentation recorded by the scribe accurately reflects the service I personally performed and the decisions made by me.

## 2024-05-01 NOTE — PHYSICAL EXAM
[Right] : right knee [] : no sero-sanguinous drainage [NL (0)] : extension 0 degrees [FreeTextEntry8] : calf is soft and compressible, no sign of dvt  [TWNoteComboBox7] : flexion 100 degrees

## 2024-05-01 NOTE — HISTORY OF PRESENT ILLNESS
[de-identified] : DOS 4/19/24 5/1/24: Here for POV1 s/p PMM. Denies fever/chills/nausea after surgery. Doing well postoperatively.  Did experience calf pain after the surgery, underwent doppler which was negative.   3/13/24: here to follow up right hamstring and knee. CSI 1/24/24 relief lasted about six weeks. Pain is now back at initial levels.  01/24/2024 : MIGUE MADISON is a 58 year female presenting today for right hamstring and knee pain. Patient was doing a cartwheel on 12/24/23 and when she bowed forward she felt a pop in her hamstring. started experiencing right knee pain a few days later. Worse with stairs, prolonged walking, pivoting. Better with rest, ice. Prior treatment: O&C LONI camacho, MRI @ [facility]. Occupation: special  PMH: High cholesterol

## 2024-05-29 ENCOUNTER — APPOINTMENT (OUTPATIENT)
Dept: ORTHOPEDIC SURGERY | Facility: CLINIC | Age: 59
End: 2024-05-29
Payer: COMMERCIAL

## 2024-05-29 VITALS — HEIGHT: 66 IN | WEIGHT: 194 LBS | BODY MASS INDEX: 31.18 KG/M2

## 2024-05-29 PROCEDURE — 99024 POSTOP FOLLOW-UP VISIT: CPT

## 2024-05-30 NOTE — H&P PST ADULT - ENMT COMMENTS
Boby Montez Patient Age: 24 year old  MESSAGE:   Reason for call:  COM EE    Last seen by Dr. Leroy Esparza  date of last visit: 12/21/22    Was patient referred by another physician to be seen for this issue? No    Is patient currently experiencing symptoms? No    Patient's insurance up to date in registration? Yes    Additional Info: Patient accepted first available with Dr. Esparza on 11/2/24 and placed on waiting list, however, would like to know if he can be worked in sooner  by Dr. Esparza or be seen by one of Dr. Esparza's partners     WEIGHT AND HEIGHT:   Wt Readings from Last 1 Encounters:   01/06/22 71.2 kg (157 lb)     Ht Readings from Last 1 Encounters:   01/06/22 5' 10\" (1.778 m)     BMI Readings from Last 1 Encounters:   01/06/22 22.53 kg/m²       ALLERGIES:  Patient has no known allergies.  Current Outpatient Medications   Medication Sig Dispense Refill    EPINEPHrine (Auvi-Q) 0.3 MG/0.3ML auto-injector Inject 0.3 mLs into the muscle as needed for Anaphylaxis. 2 each 1    tretinoin (RETIN-A) 0.05 % cream Apply topically nightly. To affected skin area for 8 to 12 weeks and then as needed 45 g 8     No current facility-administered medications for this visit.     PHARMACY to use:     [id number] - [place], [place] - [address]  [address] [address]  Phone: [phone] Fax: [phone]     Pharmacy preference(s) on file:     CALL BACK INFO: Ok to leave response (including medical information) with family member or on answering machine  ROUTING: Patient's physician/staff        PCP: Jermaine Coleman DO         INS: Payor:  [employer] / Plan:  BE BA  / Product Type:  BLUE ADVANTAGE   PATIENT ADDRESS:  [address] [place] [address] [place] [address]  
Left voice mail for patient returning his call. Patient can schedule first available com ee with Dr. Hurtado. We will keep him on the wait list for Dr. Esparza and will call if he has any cancelations  
Patient has been rescheduled with Dr. Hurtado.  
chronic sinus problems

## 2024-05-31 NOTE — PHYSICAL EXAM
[Right] : right knee [NL (0)] : extension 0 degrees [Negative] : negative Junior's [] : no pain with varus stress [FreeTextEntry8] : calf is soft and compressible, no sign of dvt  [TWNoteComboBox7] : flexion 135 degrees

## 2024-05-31 NOTE — DISCUSSION/SUMMARY
[de-identified] : 59f s/p right knee pmm 04.19.24 1) reviewed post-op precautions and procedures. 2) c/w physical therapy 3) cryotherapy, rest and activity modification 4) rtc 6 weeks, will consider aspiration if effusion persists  Entered by Lesley Trejo acting as scribe. Dr. Tam- The documentation recorded by the scribe accurately reflects the service I personally performed and the decisions made by me.

## 2024-05-31 NOTE — HISTORY OF PRESENT ILLNESS
[de-identified] : DOS 4/19/24 5/29/24: Here for POV2 s/p PMM. In PT, progressing well. Some complaints of posterior knee pain/tightness and soreness, especially sit to stand and stand to sit.  5/1/24: Here for POV1 s/p PMM. Denies fever/chills/nausea after surgery. Doing well postoperatively.  Did experience calf pain after the surgery, underwent doppler which was negative.   3/13/24: here to follow up right hamstring and knee. CSI 1/24/24 relief lasted about six weeks. Pain is now back at initial levels.  01/24/2024 : MIGUE MADISON is a 58 year female presenting today for right hamstring and knee pain. Patient was doing a cartwheel on 12/24/23 and when she bowed forward she felt a pop in her hamstring. started experiencing right knee pain a few days later. Worse with stairs, prolonged walking, pivoting. Better with rest, ice. Prior treatment: O&C LONI camacho, MRI @ [facility]. Occupation: special  PMH: High cholesterol

## 2024-06-11 ENCOUNTER — APPOINTMENT (OUTPATIENT)
Dept: ORTHOPEDIC SURGERY | Facility: CLINIC | Age: 59
End: 2024-06-11
Payer: COMMERCIAL

## 2024-06-11 DIAGNOSIS — Z98.890 OTHER SPECIFIED POSTPROCEDURAL STATES: ICD-10-CM

## 2024-06-11 PROCEDURE — 20611 DRAIN/INJ JOINT/BURSA W/US: CPT | Mod: RT

## 2024-06-11 PROCEDURE — 99024 POSTOP FOLLOW-UP VISIT: CPT

## 2024-06-11 PROCEDURE — J3490M: CUSTOM

## 2024-06-12 NOTE — PROCEDURE
[Effusion] : effusion [de-identified] : 50cc [] : Patient tolerated procedure well [de-identified] : clear nsf [FreeTextEntry3] : Large joint injection was performed of the right knee. An injection of Lidocaine 3cc of 1% , Bupivacaine (Marcaine) 6cc of 0.5% , Triamcinolone (Kenalog) 2cc of 40 mg  was used. Patient was advised to call if redness, pain or fever occur and apply ice for 15 minutes out of every hour for the next 12-24 hours as tolerated.   Patient has tried OTC's including aspirin, Ibuprofen, Aleve, etc or prescription NSAIDS, and/or exercises at home and/or physical therapy without satisfactory response, patient had decreased mobility in the joint and the risks benefits, and alternatives have been discussed, and verbal consent was obtained.  The site was prepped with alcohol, betadine and ethyl chloride sprayed topically  The risks, benefits and contents of the injection have been discussed.  Risks include but are not limited to allergic reaction, flare reaction, permanent white skin discoloration at the injection site and infection.  The patient understands the risks and agrees to having the injection.  All questions have been answered.  Ultrasound guidance was indicated for this patient due to prior failure or difficult injection. All ultrasound images have been permanently captured and stored accordingly in our picture archiving and communication system.

## 2024-06-12 NOTE — DISCUSSION/SUMMARY
[de-identified] : 59f s/p right knee pmm 04.19.24 1) csi  / asp right knee today - tolerated well  - 50cc clear nsf 2) c/w physical therapy 3) cryotherapy, rest and activity modification 4) rtc 4 weeks  Entered by Lesley Trejo acting as scribe. Dr. Tam- The documentation recorded by the scribe accurately reflects the service I personally performed and the decisions made by me.

## 2024-06-12 NOTE — HISTORY OF PRESENT ILLNESS
[6] : 6 [Sharp] : sharp [Tightness] : tightness [] : yes [de-identified] : DOS 4/19/24 6/11/24: Here for pov s/p right knee pmm. Reports some persistent swelling and discomfort over the right knee.  5/29/24: Here for POV2 s/p PMM. In PT, progressing well. Some complaints of posterior knee pain/tightness and soreness, especially sit to stand and stand to sit.  5/1/24: Here for POV1 s/p PMM. Denies fever/chills/nausea after surgery. Doing well postoperatively.  Did experience calf pain after the surgery, underwent doppler which was negative.   3/13/24: here to follow up right hamstring and knee. CSI 1/24/24 relief lasted about six weeks. Pain is now back at initial levels.  01/24/2024 : MIGUE MADISON is a 58 year female presenting today for right hamstring and knee pain. Patient was doing a cartwheel on 12/24/23 and when she bowed forward she felt a pop in her hamstring. started experiencing right knee pain a few days later. Worse with stairs, prolonged walking, pivoting. Better with rest, ice. Prior treatment: O&C LONI camacho, MRI @ [facility]. Occupation: special  PMH: High cholesterol  [FreeTextEntry5] : Reports she is still experiencing swelling and unable to go up the stairs without discomfort. Has her son's wedding in 1.5 weeks, would like to discuss tx options.

## 2024-07-09 ENCOUNTER — APPOINTMENT (OUTPATIENT)
Dept: CARDIOLOGY | Facility: CLINIC | Age: 59
End: 2024-07-09
Payer: COMMERCIAL

## 2024-07-09 ENCOUNTER — NON-APPOINTMENT (OUTPATIENT)
Age: 59
End: 2024-07-09

## 2024-07-09 VITALS
BODY MASS INDEX: 30.83 KG/M2 | DIASTOLIC BLOOD PRESSURE: 60 MMHG | WEIGHT: 191 LBS | HEART RATE: 83 BPM | OXYGEN SATURATION: 98 % | SYSTOLIC BLOOD PRESSURE: 122 MMHG

## 2024-07-09 VITALS
DIASTOLIC BLOOD PRESSURE: 60 MMHG | HEIGHT: 66 IN | HEART RATE: 83 BPM | WEIGHT: 191 LBS | OXYGEN SATURATION: 98 % | SYSTOLIC BLOOD PRESSURE: 122 MMHG | RESPIRATION RATE: 14 BRPM | BODY MASS INDEX: 30.7 KG/M2

## 2024-07-09 DIAGNOSIS — E78.00 PURE HYPERCHOLESTEROLEMIA, UNSPECIFIED: ICD-10-CM

## 2024-07-09 DIAGNOSIS — R07.89 OTHER CHEST PAIN: ICD-10-CM

## 2024-07-09 DIAGNOSIS — I34.0 NONRHEUMATIC MITRAL (VALVE) INSUFFICIENCY: ICD-10-CM

## 2024-07-09 DIAGNOSIS — Z11.52 ENCOUNTER FOR PREPROCEDURAL LABORATORY EXAMINATION: ICD-10-CM

## 2024-07-09 DIAGNOSIS — Z86.79 PERSONAL HISTORY OF OTHER DISEASES OF THE CIRCULATORY SYSTEM: ICD-10-CM

## 2024-07-09 DIAGNOSIS — Z01.812 ENCOUNTER FOR PREPROCEDURAL LABORATORY EXAMINATION: ICD-10-CM

## 2024-07-09 DIAGNOSIS — Z01.810 ENCOUNTER FOR PREPROCEDURAL CARDIOVASCULAR EXAMINATION: ICD-10-CM

## 2024-07-09 DIAGNOSIS — I49.3 VENTRICULAR PREMATURE DEPOLARIZATION: ICD-10-CM

## 2024-07-09 PROCEDURE — 93000 ELECTROCARDIOGRAM COMPLETE: CPT

## 2024-07-09 PROCEDURE — 99214 OFFICE O/P EST MOD 30 MIN: CPT

## 2024-07-09 PROCEDURE — G2211 COMPLEX E/M VISIT ADD ON: CPT

## 2024-07-10 ENCOUNTER — APPOINTMENT (OUTPATIENT)
Dept: ORTHOPEDIC SURGERY | Facility: CLINIC | Age: 59
End: 2024-07-10

## 2024-07-10 VITALS — BODY MASS INDEX: 30.7 KG/M2 | WEIGHT: 191 LBS | HEIGHT: 66 IN

## 2024-07-10 PROCEDURE — 99214 OFFICE O/P EST MOD 30 MIN: CPT | Mod: 25

## 2024-07-10 PROCEDURE — 20611 DRAIN/INJ JOINT/BURSA W/US: CPT | Mod: 78,RT

## 2024-07-10 PROCEDURE — 99024 POSTOP FOLLOW-UP VISIT: CPT

## 2024-07-16 ENCOUNTER — APPOINTMENT (OUTPATIENT)
Dept: ORTHOPEDIC SURGERY | Facility: CLINIC | Age: 59
End: 2024-07-16
Payer: COMMERCIAL

## 2024-07-16 VITALS — BODY MASS INDEX: 30.7 KG/M2 | WEIGHT: 191 LBS | HEIGHT: 66 IN

## 2024-07-16 DIAGNOSIS — Z98.890 OTHER SPECIFIED POSTPROCEDURAL STATES: ICD-10-CM

## 2024-07-16 PROCEDURE — 20611 DRAIN/INJ JOINT/BURSA W/US: CPT | Mod: RT

## 2024-07-24 ENCOUNTER — APPOINTMENT (OUTPATIENT)
Dept: ORTHOPEDIC SURGERY | Facility: CLINIC | Age: 59
End: 2024-07-24

## 2024-07-24 VITALS — WEIGHT: 191 LBS | HEIGHT: 66 IN | BODY MASS INDEX: 30.7 KG/M2

## 2024-07-24 DIAGNOSIS — M17.11 UNILATERAL PRIMARY OSTEOARTHRITIS, RIGHT KNEE: ICD-10-CM

## 2024-07-24 PROCEDURE — 20611 DRAIN/INJ JOINT/BURSA W/US: CPT | Mod: RT

## 2024-07-24 NOTE — DISCUSSION/SUMMARY
[de-identified] : 59f s/p right knee pmm 04.19.24 and with acute exacerbation of right knee djd and effusion discussed availability of visco injections and pt would like to proceed.  Euflexxa #3 Injection tolerated well. Post injection instructions reviewed. 1) wbat, cryotherapy 2) rtc 1 week   Aspiration right knee today - tolerated well  - 15cc  Entered by Lesley Trejo acting as scribe. Dr. Tam- The documentation recorded by the scribe accurately reflects the service I personally performed and the decisions made by me.

## 2024-07-24 NOTE — HISTORY OF PRESENT ILLNESS
[6] : 6 [Sharp] : sharp [Tightness] : tightness [] : yes [de-identified] : DOS 4/19/24 7/24/24: Pt here for f/up right knee and euflexxa #3. Reports periods of tightness in the knee and distal quad. Concerned about edema persisting as well. Has returned to HEP with light weights.  7/16/24: Here for POV4 s/p right knee PMM and euflexxa #2.  7/10/24: Pt here for pov4 s/p R knee PMM. States felt better post asp/csi until 6/30/24. Reports pain and edema has returned medial knee. 'constant throbbing' Using ice and advil PRN.  Has d/c therapy secondary to increased symptoms.  6/11/24: Here for pov s/p right knee pmm. Reports some persistent swelling and discomfort over the right knee.  5/29/24: Here for POV2 s/p PMM. In PT, progressing well. Some complaints of posterior knee pain/tightness and soreness, especially sit to stand and stand to sit.  5/1/24: Here for POV1 s/p PMM. Denies fever/chills/nausea after surgery. Doing well postoperatively.  Did experience calf pain after the surgery, underwent doppler which was negative.   3/13/24: here to follow up right hamstring and knee. CSI 1/24/24 relief lasted about six weeks. Pain is now back at initial levels.  01/24/2024 : MIGUE MADISON is a 58 year female presenting today for right hamstring and knee pain. Patient was doing a cartwheel on 12/24/23 and when she bowed forward she felt a pop in her hamstring. started experiencing right knee pain a few days later. Worse with stairs, prolonged walking, pivoting. Better with rest, ice. Prior treatment: O&C [facility] H[facility]SE[facility] camacho, MRI @ [facility]. Occupation: special  PMH: High cholesterol  [FreeTextEntry5] : PO#5 and Euflexxa #2 RT knee today. Reports she is still experiencing swelling and pain when going up/downstairs.

## 2024-07-24 NOTE — PROCEDURE
[FreeTextEntry3] : Aspiration was performed of the right knee. Aspiration was indicated due to effusion. The amount aspirated was [15] cc. The amount aspirated was clear nsf. Patient tolerated procedure well.  Large joint injection was performed  of the right knee. The indication for this procedure was x-ray evidence of Osteoarthritis on this or prior visit. The site was prepped with alcohol and betadine. An injection of Lidocaine 3cc of 1% , Euflexxa 20mg, # 3 was used.   Patient was advised to call if redness, pain or fever occur and apply ice for 15 minutes out of every hour for the next 12-24 hours as tolerated.   Patient has tried OTC's including aspirin, Ibuprofen, Aleve, etc or prescription NSAIDS, and/or exercises at home and/or physical therapy without satisfactory response, patient had decreased mobility in the joint and the risks benefits, and alternatives have been discussed, and verbal consent was obtained.   The risks, benefits and contents of the injection have been discussed.  Risks include but are not limited to allergic reaction, flare reaction, permanent white skin discoloration at the injection site and infection.  The patient understands the risks and agrees to having the injection.  All questions have been answered.   Ultrasound guidance was indicated for this patient due to prior failure or difficult injection.

## 2024-09-04 ENCOUNTER — APPOINTMENT (OUTPATIENT)
Dept: ORTHOPEDIC SURGERY | Facility: CLINIC | Age: 59
End: 2024-09-04
Payer: COMMERCIAL

## 2024-09-04 VITALS — HEIGHT: 66 IN | BODY MASS INDEX: 30.7 KG/M2 | WEIGHT: 191 LBS

## 2024-09-04 DIAGNOSIS — M75.81 OTHER SHOULDER LESIONS, RIGHT SHOULDER: ICD-10-CM

## 2024-09-04 DIAGNOSIS — M25.461 EFFUSION, RIGHT KNEE: ICD-10-CM

## 2024-09-04 DIAGNOSIS — M75.82 OTHER SHOULDER LESIONS, LEFT SHOULDER: ICD-10-CM

## 2024-09-04 DIAGNOSIS — M17.11 UNILATERAL PRIMARY OSTEOARTHRITIS, RIGHT KNEE: ICD-10-CM

## 2024-09-04 PROCEDURE — 73030 X-RAY EXAM OF SHOULDER: CPT | Mod: LT

## 2024-09-04 PROCEDURE — 99214 OFFICE O/P EST MOD 30 MIN: CPT | Mod: 25

## 2024-09-04 PROCEDURE — 73010 X-RAY EXAM OF SHOULDER BLADE: CPT | Mod: LT

## 2024-09-04 RX ORDER — MELOXICAM 15 MG/1
15 TABLET ORAL
Qty: 30 | Refills: 0 | Status: ACTIVE | COMMUNITY
Start: 2024-09-04 | End: 1900-01-01

## 2024-09-04 NOTE — DISCUSSION/SUMMARY
[de-identified] : 59f s/p right knee pmm 04.19.24 and right knee djd. Mild effusion on exam today and has been improving since completing series of Euflexxa injections.  Also with persistent left shoulder pain and weakness on exam.  1)  meloxicam rx - gi precautions reviewed 2) cryotherapy, rest and activity modification  3) MRI left shoulder, eval rotator cuff 4) rtc after MRI  Entered by Lesley Trejo acting as scribe. Dr. Tam- The documentation recorded by the scribe accurately reflects the service I personally performed and the decisions made by me.

## 2024-09-04 NOTE — HISTORY OF PRESENT ILLNESS
[6] : 6 [Sharp] : sharp [Tightness] : tightness [] : yes [de-identified] : DOS 4/19/24 9/4/24: Here to f/up right knee. Feeling relief after euflexxa injection series. Some complaints of occasional swelling and lateral/posterior knee pain. Reports that she is able go get through most of her ADLs without pain.  Also c/o right shoulder pain and weakness, difficulty with reaching OH, pain has been present intermittently over the past few years.  7/24/24: Pt here for f/up right knee and euflexxa #3. Reports periods of tightness in the knee and distal quad. Concerned about edema persisting as well. Has returned to HEP with light weights.  7/16/24: Here for POV4 s/p right knee PMM and euflexxa #2.  7/10/24: Pt here for pov4 s/p R knee PMM. States felt better post asp/csi until 6/30/24. Reports pain and edema has returned medial knee. 'constant throbbing' Using ice and advil PRN.  Has d/c therapy secondary to increased symptoms.  6/11/24: Here for pov s/p right knee pmm. Reports some persistent swelling and discomfort over the right knee.  5/29/24: Here for POV2 s/p PMM. In PT, progressing well. Some complaints of posterior knee pain/tightness and soreness, especially sit to stand and stand to sit.  5/1/24: Here for POV1 s/p PMM. Denies fever/chills/nausea after surgery. Doing well postoperatively.  Did experience calf pain after the surgery, underwent doppler which was negative.   3/13/24: here to follow up right hamstring and knee. CSI 1/24/24 relief lasted about six weeks. Pain is now back at initial levels.  01/24/2024 : MIGUE MADISON is a 58 year female presenting today for right hamstring and knee pain. Patient was doing a cartwheel on 12/24/23 and when she bowed forward she felt a pop in her hamstring. started experiencing right knee pain a few days later. Worse with stairs, prolonged walking, pivoting. Better with rest, ice. Prior treatment: O&C LONI camacho, MRI @ [facility]. Occupation: special  PMH: High cholesterol  [FreeTextEntry5] : PO#5 and Euflexxa #2 RT knee today. Reports she is still experiencing swelling and pain when going up/downstairs.

## 2024-09-04 NOTE — PHYSICAL EXAM
[Right] : right knee [NL (0)] : extension 0 degrees [Negative] : negative Junior's [NL (0-180)] : full active abduction 0-180 degrees [5 ___] : forward flexion 5[unfilled]/5 [4___] : internal rotation 4[unfilled]/5 [Left] : left shoulder [de-identified] : external rotation at 90 degrees of abduction 80 degrees [TWNoteComboBox5] : internal rotation at 90 degrees of abduction 80 degrees [] : no pain with varus stress [FreeTextEntry8] : calf is soft and compressible, no sign of dvt  [TWNoteComboBox7] : flexion 135 degrees

## 2024-09-10 ENCOUNTER — RESULT REVIEW (OUTPATIENT)
Age: 59
End: 2024-09-10

## 2024-09-25 ENCOUNTER — APPOINTMENT (OUTPATIENT)
Dept: ORTHOPEDIC SURGERY | Facility: CLINIC | Age: 59
End: 2024-09-25
Payer: COMMERCIAL

## 2024-09-25 VITALS — WEIGHT: 191 LBS | BODY MASS INDEX: 30.7 KG/M2 | HEIGHT: 66 IN

## 2024-09-25 DIAGNOSIS — M75.122 COMPLETE ROTATOR CUFF TEAR OR RUPTURE OF LEFT SHOULDER, NOT SPECIFIED AS TRAUMATIC: ICD-10-CM

## 2024-09-25 PROCEDURE — 99214 OFFICE O/P EST MOD 30 MIN: CPT

## 2024-09-25 NOTE — PHYSICAL EXAM
[Left] : left shoulder [NL (0-180)] : full active abduction 0-180 degrees [5 ___] : forward flexion 5[unfilled]/5 [4___] : internal rotation 4[unfilled]/5 [Right] : right knee [NL (0)] : extension 0 degrees [Negative] : negative Junior's [de-identified] : external rotation at 90 degrees of abduction 80 degrees [TWNoteComboBox5] : internal rotation at 90 degrees of abduction 80 degrees [] : no pain with varus stress [FreeTextEntry8] : calf is soft and compressible, no sign of dvt  [TWNoteComboBox7] : flexion 135 degrees

## 2024-09-25 NOTE — HISTORY OF PRESENT ILLNESS
[6] : 6 [Sharp] : sharp [Tightness] : tightness [] : yes [de-identified] : DOS 4/19/24 9/25/24: Here to f/up right knee and left shoulder. Here to review MRI results for L shoulder from  9/10/24. Right knee is in increased pain today. L shoulder feels about the same. Did not start meloxicam, was unsure if it interferes with supplements she is taking.  9/4/24: Here to f/up right knee. Feeling relief after euflexxa injection series. Some complaints of occasional swelling and lateral/posterior knee pain. Reports that she is able go get through most of her ADLs without pain.  Also c/o right shoulder pain and weakness, difficulty with reaching OH, pain has been present intermittently over the past few years.  7/24/24: Pt here for f/up right knee and euflexxa #3. Reports periods of tightness in the knee and distal quad. Concerned about edema persisting as well. Has returned to HEP with light weights.  7/16/24: Here for POV4 s/p right knee PMM and euflexxa #2.  7/10/24: Pt here for pov4 s/p R knee PMM. States felt better post asp/csi until 6/30/24. Reports pain and edema has returned medial knee. 'constant throbbing' Using ice and advil PRN.  Has d/c therapy secondary to increased symptoms.  6/11/24: Here for pov s/p right knee pmm. Reports some persistent swelling and discomfort over the right knee.  5/29/24: Here for POV2 s/p PMM. In PT, progressing well. Some complaints of posterior knee pain/tightness and soreness, especially sit to stand and stand to sit.  5/1/24: Here for POV1 s/p PMM. Denies fever/chills/nausea after surgery. Doing well postoperatively.  Did experience calf pain after the surgery, underwent doppler which was negative.   3/13/24: here to follow up right hamstring and knee. CSI 1/24/24 relief lasted about six weeks. Pain is now back at initial levels.  01/24/2024 : MIGUE MADISON is a 58 year female presenting today for right hamstring and knee pain. Patient was doing a cartwheel on 12/24/23 and when she bowed forward she felt a pop in her hamstring. started experiencing right knee pain a few days later. Worse with stairs, prolonged walking, pivoting. Better with rest, ice. Prior treatment: O&C LONI camacho, SOFY @ [facility]. Occupation: special  PMH: High cholesterol  [FreeTextEntry5] : PO#5 and Euflexxa #2 RT knee today. Reports she is still experiencing swelling and pain when going up/downstairs.

## 2024-09-25 NOTE — DISCUSSION/SUMMARY
[de-identified] : 59f s/p right knee pmm 04.19.24 and right knee djd. MRI (9/10/24) left shoulder with full-thickness tear of the supraspinatus without muscle atrophy 1) hep for the shoulder provided 2) cryotherapy, rest and activity modification   3) rtc 6-8 weeks    Entered by Lesley Trejo acting as scribe. Dr. Tam- The documentation recorded by the scribe accurately reflects the service I personally performed and the decisions made by me.

## 2024-09-25 NOTE — DATA REVIEWED
[MRI] : MRI [Left] : left [Shoulder] : shoulder [Report was reviewed and noted in the chart] : The report was reviewed and noted in the chart [I independently reviewed and interpreted images and report] : I independently reviewed and interpreted images and report [I reviewed the films/CD] : I reviewed the films/CD [FreeTextEntry1] : 09.10.24 ( [facility]P )  Full-thickness, partial length tear mostly involving the common rotator cuff tendon with extension into the posterior supraspinatus without muscle atrophy. Bicipital tendinitis.

## 2024-10-17 ENCOUNTER — RX RENEWAL (OUTPATIENT)
Age: 59
End: 2024-10-17

## 2024-10-30 ENCOUNTER — APPOINTMENT (OUTPATIENT)
Dept: ORTHOPEDIC SURGERY | Facility: CLINIC | Age: 59
End: 2024-10-30
Payer: COMMERCIAL

## 2024-10-30 DIAGNOSIS — Z98.890 OTHER SPECIFIED POSTPROCEDURAL STATES: ICD-10-CM

## 2024-10-30 DIAGNOSIS — M17.11 UNILATERAL PRIMARY OSTEOARTHRITIS, RIGHT KNEE: ICD-10-CM

## 2024-10-30 PROCEDURE — 99213 OFFICE O/P EST LOW 20 MIN: CPT

## 2024-11-01 ENCOUNTER — APPOINTMENT (OUTPATIENT)
Dept: MRI IMAGING | Facility: CLINIC | Age: 59
End: 2024-11-01

## 2024-11-01 PROCEDURE — 73721 MRI JNT OF LWR EXTRE W/O DYE: CPT | Mod: RT

## 2024-11-13 ENCOUNTER — APPOINTMENT (OUTPATIENT)
Dept: ORTHOPEDIC SURGERY | Facility: CLINIC | Age: 59
End: 2024-11-13
Payer: COMMERCIAL

## 2024-11-13 VITALS — BODY MASS INDEX: 30.7 KG/M2 | WEIGHT: 191 LBS | HEIGHT: 66 IN

## 2024-11-13 DIAGNOSIS — S83.271A COMPLEX TEAR OF LATERAL MENISCUS, CURRENT INJURY, RIGHT KNEE, INITIAL ENCOUNTER: ICD-10-CM

## 2024-11-13 DIAGNOSIS — Z98.890 OTHER SPECIFIED POSTPROCEDURAL STATES: ICD-10-CM

## 2024-11-13 DIAGNOSIS — M17.11 UNILATERAL PRIMARY OSTEOARTHRITIS, RIGHT KNEE: ICD-10-CM

## 2024-11-13 DIAGNOSIS — S83.231A COMPLEX TEAR OF MEDIAL MENISCUS, CURRENT INJURY, RIGHT KNEE, INITIAL ENCOUNTER: ICD-10-CM

## 2024-11-13 DIAGNOSIS — M25.461 EFFUSION, RIGHT KNEE: ICD-10-CM

## 2024-11-13 PROCEDURE — J3490M: CUSTOM | Mod: NC

## 2024-11-13 PROCEDURE — 20611 DRAIN/INJ JOINT/BURSA W/US: CPT | Mod: RT

## 2024-11-13 PROCEDURE — 99213 OFFICE O/P EST LOW 20 MIN: CPT | Mod: 25

## 2024-12-18 ENCOUNTER — APPOINTMENT (OUTPATIENT)
Dept: ORTHOPEDIC SURGERY | Facility: CLINIC | Age: 59
End: 2024-12-18
Payer: COMMERCIAL

## 2024-12-18 VITALS — BODY MASS INDEX: 30.7 KG/M2 | HEIGHT: 66 IN | WEIGHT: 191 LBS

## 2024-12-18 PROCEDURE — 20611 DRAIN/INJ JOINT/BURSA W/US: CPT | Mod: RT

## 2024-12-18 PROCEDURE — 99213 OFFICE O/P EST LOW 20 MIN: CPT | Mod: 25

## 2024-12-26 ENCOUNTER — APPOINTMENT (OUTPATIENT)
Dept: ORTHOPEDIC SURGERY | Facility: CLINIC | Age: 59
End: 2024-12-26

## 2024-12-26 VITALS — WEIGHT: 191 LBS | BODY MASS INDEX: 30.7 KG/M2 | HEIGHT: 66 IN

## 2024-12-26 DIAGNOSIS — M25.461 EFFUSION, RIGHT KNEE: ICD-10-CM

## 2024-12-26 PROCEDURE — 99213 OFFICE O/P EST LOW 20 MIN: CPT | Mod: 25

## 2024-12-26 PROCEDURE — 20611 DRAIN/INJ JOINT/BURSA W/US: CPT | Mod: RT

## 2024-12-26 PROCEDURE — J3490M: CUSTOM | Mod: NC

## 2025-01-02 ENCOUNTER — APPOINTMENT (OUTPATIENT)
Dept: ORTHOPEDIC SURGERY | Facility: CLINIC | Age: 60
End: 2025-01-02
Payer: COMMERCIAL

## 2025-01-02 VITALS — BODY MASS INDEX: 30.7 KG/M2 | WEIGHT: 191 LBS | HEIGHT: 66 IN

## 2025-01-02 DIAGNOSIS — M17.11 UNILATERAL PRIMARY OSTEOARTHRITIS, RIGHT KNEE: ICD-10-CM

## 2025-01-02 PROCEDURE — 20611 DRAIN/INJ JOINT/BURSA W/US: CPT | Mod: RT

## 2025-01-29 ENCOUNTER — NON-APPOINTMENT (OUTPATIENT)
Age: 60
End: 2025-01-29

## 2025-01-29 ENCOUNTER — APPOINTMENT (OUTPATIENT)
Facility: CLINIC | Age: 60
End: 2025-01-29

## 2025-01-29 ENCOUNTER — APPOINTMENT (OUTPATIENT)
Dept: ORTHOPEDIC SURGERY | Facility: CLINIC | Age: 60
End: 2025-01-29

## 2025-01-29 VITALS
HEART RATE: 80 BPM | DIASTOLIC BLOOD PRESSURE: 78 MMHG | OXYGEN SATURATION: 96 % | HEIGHT: 66 IN | BODY MASS INDEX: 31.66 KG/M2 | SYSTOLIC BLOOD PRESSURE: 122 MMHG | WEIGHT: 197 LBS

## 2025-01-29 DIAGNOSIS — M17.11 UNILATERAL PRIMARY OSTEOARTHRITIS, RIGHT KNEE: ICD-10-CM

## 2025-01-29 DIAGNOSIS — Z98.890 OTHER SPECIFIED POSTPROCEDURAL STATES: ICD-10-CM

## 2025-01-29 DIAGNOSIS — J40 BRONCHITIS, NOT SPECIFIED AS ACUTE OR CHRONIC: ICD-10-CM

## 2025-01-29 DIAGNOSIS — N39.0 URINARY TRACT INFECTION, SITE NOT SPECIFIED: ICD-10-CM

## 2025-01-29 DIAGNOSIS — N95.8 OTHER SPECIFIED MENOPAUSAL AND PERIMENOPAUSAL DISORDERS: ICD-10-CM

## 2025-01-29 DIAGNOSIS — R39.15 URGENCY OF URINATION: ICD-10-CM

## 2025-01-29 DIAGNOSIS — M25.461 EFFUSION, RIGHT KNEE: ICD-10-CM

## 2025-01-29 LAB
BILIRUB UR QL STRIP: NORMAL
CLARITY UR: CLEAR
COLLECTION METHOD: NORMAL
GLUCOSE UR-MCNC: NORMAL
HCG UR QL: 0.2 EU/DL
HGB UR QL STRIP.AUTO: ABNORMAL
KETONES UR-MCNC: NORMAL
LEUKOCYTE ESTERASE UR QL STRIP: NORMAL
NITRITE UR QL STRIP: NORMAL
PH UR STRIP: 5.5
PROT UR STRIP-MCNC: NORMAL
SP GR UR STRIP: 1.02

## 2025-01-29 PROCEDURE — 99213 OFFICE O/P EST LOW 20 MIN: CPT | Mod: 25

## 2025-01-29 PROCEDURE — 99205 OFFICE O/P NEW HI 60 MIN: CPT | Mod: 25

## 2025-01-29 PROCEDURE — 99459 PELVIC EXAMINATION: CPT

## 2025-01-29 PROCEDURE — 20611 DRAIN/INJ JOINT/BURSA W/US: CPT | Mod: RT

## 2025-01-29 PROCEDURE — 51701 INSERT BLADDER CATHETER: CPT

## 2025-01-30 PROBLEM — J40 BRONCHITIS: Status: ACTIVE | Noted: 2025-01-30

## 2025-01-30 LAB
APPEARANCE: CLEAR
BACTERIA: NEGATIVE /HPF
BILIRUBIN URINE: NEGATIVE
BLOOD URINE: NEGATIVE
CAST: 0 /LPF
COLOR: YELLOW
EPITHELIAL CELLS: 0 /HPF
GLUCOSE QUALITATIVE U: NEGATIVE MG/DL
KETONES URINE: NEGATIVE MG/DL
LEUKOCYTE ESTERASE URINE: NEGATIVE
MICROSCOPIC-UA: NORMAL
NITRITE URINE: NEGATIVE
PH URINE: 5.5
PROTEIN URINE: NEGATIVE MG/DL
RED BLOOD CELLS URINE: 0 /HPF
SPECIFIC GRAVITY URINE: 1.02
UROBILINOGEN URINE: 0.2 MG/DL
WHITE BLOOD CELLS URINE: 0 /HPF

## 2025-01-30 RX ORDER — OMEPRAZOLE 20 MG/1
20 TABLET, DELAYED RELEASE ORAL
Refills: 0 | Status: ACTIVE | COMMUNITY

## 2025-01-31 ENCOUNTER — NON-APPOINTMENT (OUTPATIENT)
Age: 60
End: 2025-01-31

## 2025-01-31 LAB — BACTERIA UR CULT: NORMAL

## 2025-02-19 ENCOUNTER — APPOINTMENT (OUTPATIENT)
Facility: CLINIC | Age: 60
End: 2025-02-19

## 2025-04-16 ENCOUNTER — NON-APPOINTMENT (OUTPATIENT)
Age: 60
End: 2025-04-16

## 2025-04-16 ENCOUNTER — APPOINTMENT (OUTPATIENT)
Facility: CLINIC | Age: 60
End: 2025-04-16
Payer: COMMERCIAL

## 2025-04-16 VITALS
SYSTOLIC BLOOD PRESSURE: 115 MMHG | HEIGHT: 66 IN | WEIGHT: 197 LBS | BODY MASS INDEX: 31.66 KG/M2 | DIASTOLIC BLOOD PRESSURE: 76 MMHG

## 2025-04-16 DIAGNOSIS — N39.0 URINARY TRACT INFECTION, SITE NOT SPECIFIED: ICD-10-CM

## 2025-04-16 LAB
BILIRUB UR QL STRIP: NEGATIVE
CLARITY UR: CLEAR
COLLECTION METHOD: NORMAL
GLUCOSE UR-MCNC: NEGATIVE
HCG UR QL: 0.2 EU/DL
HGB UR QL STRIP.AUTO: NEGATIVE
KETONES UR-MCNC: NEGATIVE
LEUKOCYTE ESTERASE UR QL STRIP: NEGATIVE
NITRITE UR QL STRIP: NEGATIVE
PH UR STRIP: 5.5
PROT UR STRIP-MCNC: NEGATIVE
SP GR UR STRIP: 1.01

## 2025-04-16 PROCEDURE — 52000 CYSTOURETHROSCOPY: CPT | Mod: 53

## 2025-04-26 ENCOUNTER — APPOINTMENT (OUTPATIENT)
Dept: ORTHOPEDIC SURGERY | Facility: CLINIC | Age: 60
End: 2025-04-26

## 2025-04-26 VITALS — BODY MASS INDEX: 31.66 KG/M2 | WEIGHT: 197 LBS | HEIGHT: 66 IN

## 2025-04-26 DIAGNOSIS — M51.9 UNSPECIFIED THORACIC, THORACOLUMBAR AND LUMBOSACRAL INTERVERTEBRAL DISC DISORDER: ICD-10-CM

## 2025-04-26 DIAGNOSIS — S33.5XXA SPRAIN OF LIGAMENTS OF LUMBAR SPINE, INITIAL ENCOUNTER: ICD-10-CM

## 2025-04-26 PROCEDURE — 72170 X-RAY EXAM OF PELVIS: CPT

## 2025-04-26 PROCEDURE — 72100 X-RAY EXAM L-S SPINE 2/3 VWS: CPT

## 2025-04-26 PROCEDURE — 99213 OFFICE O/P EST LOW 20 MIN: CPT

## 2025-04-26 RX ORDER — METHYLPREDNISOLONE 4 MG/1
4 TABLET ORAL
Qty: 1 | Refills: 0 | Status: ACTIVE | COMMUNITY
Start: 2025-04-26 | End: 1900-01-01

## 2025-04-26 RX ORDER — DICLOFENAC SODIUM 75 MG/1
75 TABLET, DELAYED RELEASE ORAL TWICE DAILY
Qty: 60 | Refills: 3 | Status: ACTIVE | COMMUNITY
Start: 2025-04-26 | End: 2025-08-24

## 2025-04-28 ENCOUNTER — RESULT REVIEW (OUTPATIENT)
Age: 60
End: 2025-04-28

## 2025-05-01 ENCOUNTER — APPOINTMENT (OUTPATIENT)
Dept: ORTHOPEDIC SURGERY | Facility: CLINIC | Age: 60
End: 2025-05-01
Payer: COMMERCIAL

## 2025-05-01 VITALS — WEIGHT: 197 LBS | BODY MASS INDEX: 31.66 KG/M2 | HEIGHT: 66 IN

## 2025-05-01 DIAGNOSIS — M54.50 LOW BACK PAIN, UNSPECIFIED: ICD-10-CM

## 2025-05-01 DIAGNOSIS — G89.29 LOW BACK PAIN, UNSPECIFIED: ICD-10-CM

## 2025-05-01 PROCEDURE — 99213 OFFICE O/P EST LOW 20 MIN: CPT

## 2025-07-03 ENCOUNTER — APPOINTMENT (OUTPATIENT)
Dept: ORTHOPEDIC SURGERY | Facility: CLINIC | Age: 60
End: 2025-07-03

## 2025-07-15 ENCOUNTER — APPOINTMENT (OUTPATIENT)
Dept: CARDIOLOGY | Facility: CLINIC | Age: 60
End: 2025-07-15
Payer: COMMERCIAL

## 2025-07-15 VITALS
DIASTOLIC BLOOD PRESSURE: 84 MMHG | HEIGHT: 66 IN | WEIGHT: 200 LBS | OXYGEN SATURATION: 98 % | SYSTOLIC BLOOD PRESSURE: 130 MMHG | HEART RATE: 63 BPM | BODY MASS INDEX: 32.14 KG/M2 | RESPIRATION RATE: 14 BRPM

## 2025-07-15 VITALS
BODY MASS INDEX: 32.14 KG/M2 | WEIGHT: 200 LBS | DIASTOLIC BLOOD PRESSURE: 88 MMHG | SYSTOLIC BLOOD PRESSURE: 120 MMHG | HEART RATE: 63 BPM | HEIGHT: 66 IN | OXYGEN SATURATION: 98 %

## 2025-07-15 VITALS — SYSTOLIC BLOOD PRESSURE: 130 MMHG | DIASTOLIC BLOOD PRESSURE: 84 MMHG

## 2025-07-15 PROCEDURE — G2211 COMPLEX E/M VISIT ADD ON: CPT | Mod: NC

## 2025-07-15 PROCEDURE — 93000 ELECTROCARDIOGRAM COMPLETE: CPT

## 2025-07-15 PROCEDURE — 99214 OFFICE O/P EST MOD 30 MIN: CPT

## 2025-07-15 RX ORDER — TIRZEPATIDE 2.5 MG/.5ML
2.5 INJECTION, SOLUTION SUBCUTANEOUS
Refills: 0 | Status: ACTIVE | COMMUNITY

## 2025-08-11 ENCOUNTER — NON-APPOINTMENT (OUTPATIENT)
Age: 60
End: 2025-08-11

## 2025-08-11 ENCOUNTER — APPOINTMENT (OUTPATIENT)
Dept: CARDIOLOGY | Facility: CLINIC | Age: 60
End: 2025-08-11
Payer: COMMERCIAL

## 2025-08-11 VITALS
HEART RATE: 73 BPM | WEIGHT: 193 LBS | DIASTOLIC BLOOD PRESSURE: 68 MMHG | OXYGEN SATURATION: 99 % | SYSTOLIC BLOOD PRESSURE: 124 MMHG | HEIGHT: 66 IN | BODY MASS INDEX: 31.02 KG/M2

## 2025-08-11 DIAGNOSIS — I34.0 NONRHEUMATIC MITRAL (VALVE) INSUFFICIENCY: ICD-10-CM

## 2025-08-11 DIAGNOSIS — E78.00 PURE HYPERCHOLESTEROLEMIA, UNSPECIFIED: ICD-10-CM

## 2025-08-11 DIAGNOSIS — R07.9 CHEST PAIN, UNSPECIFIED: ICD-10-CM

## 2025-08-11 PROCEDURE — 93000 ELECTROCARDIOGRAM COMPLETE: CPT

## 2025-08-11 PROCEDURE — G2211 COMPLEX E/M VISIT ADD ON: CPT | Mod: NC

## 2025-08-11 PROCEDURE — 99214 OFFICE O/P EST MOD 30 MIN: CPT

## 2025-08-11 RX ORDER — IBUPROFEN 600 MG/1
600 TABLET, FILM COATED ORAL 3 TIMES DAILY
Refills: 0 | Status: ACTIVE | COMMUNITY

## 2025-08-11 RX ORDER — CYCLOBENZAPRINE 10 MG/1
10 TABLET ORAL
Refills: 0 | Status: ACTIVE | COMMUNITY

## 2025-08-18 ENCOUNTER — APPOINTMENT (OUTPATIENT)
Dept: CARDIOLOGY | Facility: CLINIC | Age: 60
End: 2025-08-18
Payer: COMMERCIAL

## 2025-08-18 PROCEDURE — 93015 CV STRESS TEST SUPVJ I&R: CPT

## (undated) DEVICE — NDL HYPO SAFE 22G X 1.5" (BLACK)

## (undated) DEVICE — DRAPE U 47X51" LF STERILE

## (undated) DEVICE — VENODYNE/SCD SLEEVE CALF MEDIUM

## (undated) DEVICE — SOLIDIFIER CANN EXPRESS 3K

## (undated) DEVICE — DRAPE 3/4 SHEET 52X76"

## (undated) DEVICE — NDL SPINAL 18G X 3.5" (PINK)

## (undated) DEVICE — PACK KNEE ARTHROSCOPY

## (undated) DEVICE — S&N ARTHROCARE WAND COBLATION WEREWOLF FLOW 90

## (undated) DEVICE — SUT MONOCRYL 3-0 27" PS-2 UNDYED

## (undated) DEVICE — SHAVER BLADE LINVATEC ULTRAFRR 3.5MM

## (undated) DEVICE — POSITIONER PATIENT SAFETY STRAP 3X60"

## (undated) DEVICE — WARMING BLANKET UPPER ADULT

## (undated) DEVICE — TUBING SUCTION 20FT

## (undated) DEVICE — TUBING DEPUY MITEK FMS VUE INFLOW

## (undated) DEVICE — LAP PAD W RING 18 X 18"

## (undated) DEVICE — POSITIONER STRAP ARMBOARD VELCRO TS-30

## (undated) DEVICE — TUBING DEPUY MITEK FMS OUTFLOW

## (undated) DEVICE — S&N FASTFIX 360 CURVED KNOT PUSHER SET

## (undated) DEVICE — SOL IRR BAG NS 0.9% 3000ML

## (undated) DEVICE — DVC SUCTION FLR PUDDLE GUPPY

## (undated) DEVICE — POSITIONER STIRRUP STRAP W SLIP RING 19X3.5"